# Patient Record
Sex: MALE | Race: OTHER | Employment: FULL TIME | URBAN - METROPOLITAN AREA
[De-identification: names, ages, dates, MRNs, and addresses within clinical notes are randomized per-mention and may not be internally consistent; named-entity substitution may affect disease eponyms.]

---

## 2019-06-12 ENCOUNTER — OFFICE VISIT (OUTPATIENT)
Dept: PRIMARY CARE CLINIC | Age: 39
End: 2019-06-12
Payer: COMMERCIAL

## 2019-06-12 VITALS
BODY MASS INDEX: 26.92 KG/M2 | HEART RATE: 91 BPM | SYSTOLIC BLOOD PRESSURE: 142 MMHG | OXYGEN SATURATION: 98 % | HEIGHT: 70 IN | DIASTOLIC BLOOD PRESSURE: 90 MMHG | WEIGHT: 188 LBS

## 2019-06-12 DIAGNOSIS — Z76.89 ENCOUNTER TO ESTABLISH CARE WITH NEW DOCTOR: Primary | ICD-10-CM

## 2019-06-12 DIAGNOSIS — I10 ESSENTIAL HYPERTENSION: ICD-10-CM

## 2019-06-12 PROCEDURE — 99204 OFFICE O/P NEW MOD 45 MIN: CPT | Performed by: INTERNAL MEDICINE

## 2019-06-12 RX ORDER — AMLODIPINE BESYLATE 10 MG/1
TABLET ORAL
COMMUNITY
Start: 2019-03-31 | End: 2019-07-15 | Stop reason: SDUPTHER

## 2019-06-12 RX ORDER — LOSARTAN POTASSIUM 50 MG/1
TABLET ORAL
COMMUNITY
Start: 2019-05-01 | End: 2019-07-15 | Stop reason: SDUPTHER

## 2019-06-12 RX ORDER — SIMVASTATIN 20 MG
TABLET ORAL
COMMUNITY
Start: 2019-06-09 | End: 2019-11-12 | Stop reason: SDUPTHER

## 2019-06-12 RX ORDER — LISINOPRIL 10 MG/1
TABLET ORAL
COMMUNITY
Start: 2019-05-02 | End: 2019-06-12

## 2019-06-12 RX ORDER — LABETALOL 200 MG/1
TABLET, FILM COATED ORAL
COMMUNITY
Start: 2019-05-30 | End: 2019-09-13 | Stop reason: SDUPTHER

## 2019-06-12 RX ORDER — CHLORTHALIDONE 25 MG/1
25 TABLET ORAL DAILY
Qty: 60 TABLET | Refills: 0 | Status: SHIPPED | OUTPATIENT
Start: 2019-06-12 | End: 2019-08-07 | Stop reason: SDUPTHER

## 2019-06-12 RX ORDER — CLONIDINE HYDROCHLORIDE 0.1 MG/1
TABLET ORAL
COMMUNITY
Start: 2019-04-08 | End: 2019-09-19 | Stop reason: SDUPTHER

## 2019-06-12 SDOH — HEALTH STABILITY: MENTAL HEALTH: HOW OFTEN DO YOU HAVE A DRINK CONTAINING ALCOHOL?: NOT ASKED

## 2019-06-12 ASSESSMENT — ENCOUNTER SYMPTOMS
COUGH: 0
CONSTIPATION: 0
VOMITING: 0
SHORTNESS OF BREATH: 0
NAUSEA: 0
ABDOMINAL PAIN: 0
ABDOMINAL DISTENTION: 0
WHEEZING: 0
SINUS PRESSURE: 0
SORE THROAT: 0
BACK PAIN: 0

## 2019-06-12 NOTE — PROGRESS NOTES
mouth daily 60 tablet 0     No current facility-administered medications for this visit. No Known Allergies    Health Maintenance   Topic Date Due    Varicella Vaccine (1 of 2 - 13+ 2-dose series) 04/30/1994    HIV screen  04/30/1996    DTaP/Tdap/Td vaccine (1 - Tdap) 04/30/2000    Flu vaccine (Season Ended) 09/01/2019    Potassium monitoring  09/10/2019    Creatinine monitoring  09/10/2019    Pneumococcal 0-64 years Vaccine  Aged Out       Subjective:      Review of Systems   Constitutional: Negative for appetite change, chills, fatigue and fever. HENT: Negative for congestion, sinus pressure, sneezing and sore throat. Eyes: Negative for visual disturbance. Respiratory: Negative for cough, shortness of breath and wheezing. Cardiovascular: Negative for chest pain and palpitations. Gastrointestinal: Negative for abdominal distention, abdominal pain, constipation, nausea and vomiting. Endocrine: Negative for cold intolerance, heat intolerance, polydipsia, polyphagia and polyuria. Genitourinary: Negative for decreased urine volume, difficulty urinating and urgency. Musculoskeletal: Negative for back pain and joint swelling. Skin: Negative for rash. Neurological: Negative for dizziness and headaches. Psychiatric/Behavioral: Negative for sleep disturbance. The patient is not nervous/anxious. All other systems reviewed and are negative. Objective:     Physical Exam   Constitutional: He is oriented to person, place, and time. Vital signs are normal. He appears well-developed and well-nourished. He is active. No distress. HENT:   Head: Normocephalic and atraumatic. Right Ear: Hearing normal.   Left Ear: Hearing normal.   Mouth/Throat: Uvula is midline, oropharynx is clear and moist and mucous membranes are normal.   Eyes: Pupils are equal, round, and reactive to light. Conjunctivae and EOM are normal. No scleral icterus.    Neck: Normal range of motion, full passive range of motion without pain and phonation normal. No JVD present. No thyromegaly present. Cardiovascular: Normal rate, regular rhythm, normal heart sounds, intact distal pulses and normal pulses. No murmur heard. Pulses:       Carotid pulses are 2+ on the right side, and 2+ on the left side. Radial pulses are 2+ on the right side, and 2+ on the left side. Pulmonary/Chest: Effort normal and breath sounds normal. No accessory muscle usage. No respiratory distress. He has no wheezes. He has no rales. Abdominal: Soft. Normal appearance and bowel sounds are normal. He exhibits no distension. There is no tenderness. There is no rebound. Musculoskeletal: Normal range of motion. He exhibits no edema or deformity. Lymphadenopathy:     He has no cervical adenopathy. Neurological: He is alert and oriented to person, place, and time. He has normal strength. No sensory deficit. Skin: Skin is warm and intact. Capillary refill takes less than 2 seconds. No rash noted. He is not diaphoretic. No cyanosis. Nails show no clubbing. Psychiatric: He has a normal mood and affect. His speech is normal and behavior is normal. Cognition and memory are normal.   Nursing note and vitals reviewed. BP (!) 142/90 (Site: Left Upper Arm, Position: Sitting, Cuff Size: Medium Adult)   Pulse 91   Ht 5' 10\" (1.778 m)   Wt 188 lb (85.3 kg)   SpO2 98%   BMI 26.98 kg/m²     Assessment:          1. Encounter to establish care with new doctor      2. Essential hypertension  Dash diet   - amLODIPine (NORVASC) 10 MG tablet  - cloNIDine (CATAPRES) 0.1 MG tablet  - labetalol (NORMODYNE) 200 MG tablet  - losartan (COZAAR) 50 MG tablet  - chlorthalidone (HYGROTON) 25 MG tablet; Take 1 tablet by mouth daily  Dispense: 60 tablet; Refill: 0            Diagnosis Orders   1. Encounter to establish care with new doctor     2.  Essential hypertension  amLODIPine (NORVASC) 10 MG tablet    cloNIDine (CATAPRES) 0.1 MG tablet    labetalol (NORMODYNE) 200 MG tablet    losartan (COZAAR) 50 MG tablet    chlorthalidone (HYGROTON) 25 MG tablet           Plan:      Return in about 2 months (around 8/12/2019) for Routine follow-up. No orders of the defined types were placed in this encounter. Orders Placed This Encounter   Medications    chlorthalidone (HYGROTON) 25 MG tablet     Sig: Take 1 tablet by mouth daily     Dispense:  60 tablet     Refill:  0         Patient given educational materials - see patient instructions. Discussed use, benefit, and side effects of prescribedmedications. All patient questions answered. Pt voiced understanding. Reviewed health maintenance. Instructed to continue current medications, diet and exercise. Patient agreed with treatment plan. Follow up as directed. Of the given duration appointment visit, Dr. Ruslan Riddle MD  spent at least 50% of the face-to-face time in counseling, explanation of diagnosis, planning of further management, and answering all questions. Electronically signed by Ruslan Riddle MD on 6/12/2019 at 4:58 PM      Please note that this chart was generated using voice recognition Dragon dictation software. Although every effort was made to ensure the accuracy of this automatedtranscription, some errors in transcription may have occurred.

## 2019-06-12 NOTE — PROGRESS NOTES
Visit Information    Have you changed or started any medications since your last visit including any over-the-counter medicines, vitamins, or herbal medicines? no   Have you stopped taking any of your medications? Is so, why? -  no  Are you having any side effects from any of your medications? - no    Have you seen any other physician or provider since your last visit?  no   Have you had any other diagnostic tests since your last visit?  no   Have you been seen in the emergency room and/or had an admission in a hospital since we last saw you?  no   Have you had your routine dental cleaning in the past 6 months?  yes -     Do you have an active MyChart account? If no, what is the barrier?   No:     Patient Care Team:  Christian Willis MD as PCP - General (Internal Medicine)    Medical History Review  Past Medical, Family, and Social History reviewed and does contribute to the patient presenting condition    Health Maintenance   Topic Date Due    Varicella Vaccine (1 of 2 - 13+ 2-dose series) 04/30/1994    HIV screen  04/30/1996    DTaP/Tdap/Td vaccine (1 - Tdap) 04/30/2000    Flu vaccine (Season Ended) 09/01/2019    Pneumococcal 0-64 years Vaccine  Aged Out

## 2019-07-15 DIAGNOSIS — I10 ESSENTIAL HYPERTENSION: ICD-10-CM

## 2019-07-15 RX ORDER — AMLODIPINE BESYLATE 10 MG/1
10 TABLET ORAL DAILY
Qty: 90 TABLET | Refills: 0 | Status: SHIPPED | OUTPATIENT
Start: 2019-07-15 | End: 2019-10-11 | Stop reason: SDUPTHER

## 2019-07-15 RX ORDER — LOSARTAN POTASSIUM 50 MG/1
50 TABLET ORAL DAILY
Qty: 90 TABLET | Refills: 0 | Status: SHIPPED | OUTPATIENT
Start: 2019-07-15 | End: 2019-11-12 | Stop reason: SDUPTHER

## 2019-08-07 DIAGNOSIS — I10 ESSENTIAL HYPERTENSION: ICD-10-CM

## 2019-08-07 RX ORDER — CHLORTHALIDONE 25 MG/1
25 TABLET ORAL DAILY
Qty: 60 TABLET | Refills: 0 | Status: SHIPPED | OUTPATIENT
Start: 2019-08-07 | End: 2019-10-11 | Stop reason: SDUPTHER

## 2019-08-20 ENCOUNTER — OFFICE VISIT (OUTPATIENT)
Dept: PRIMARY CARE CLINIC | Age: 39
End: 2019-08-20
Payer: COMMERCIAL

## 2019-08-20 VITALS
DIASTOLIC BLOOD PRESSURE: 74 MMHG | HEART RATE: 84 BPM | OXYGEN SATURATION: 98 % | BODY MASS INDEX: 27 KG/M2 | WEIGHT: 188.2 LBS | RESPIRATION RATE: 15 BRPM | SYSTOLIC BLOOD PRESSURE: 120 MMHG

## 2019-08-20 DIAGNOSIS — B35.4 RINGWORM OF BODY: ICD-10-CM

## 2019-08-20 DIAGNOSIS — M62.830 BACK MUSCLE SPASM: Primary | ICD-10-CM

## 2019-08-20 PROCEDURE — 99213 OFFICE O/P EST LOW 20 MIN: CPT | Performed by: INTERNAL MEDICINE

## 2019-08-20 RX ORDER — CYCLOBENZAPRINE HCL 10 MG
10 TABLET ORAL NIGHTLY PRN
Qty: 30 TABLET | Refills: 0 | Status: SHIPPED | OUTPATIENT
Start: 2019-08-20 | End: 2019-08-30

## 2019-08-20 RX ORDER — IBUPROFEN 600 MG/1
600 TABLET ORAL EVERY 6 HOURS PRN
Qty: 30 TABLET | Refills: 0 | Status: SHIPPED | OUTPATIENT
Start: 2019-08-20 | End: 2019-11-20

## 2019-08-20 RX ORDER — CLOTRIMAZOLE AND BETAMETHASONE DIPROPIONATE 10; .64 MG/G; MG/G
CREAM TOPICAL
Qty: 45 G | Refills: 1 | Status: SHIPPED | OUTPATIENT
Start: 2019-08-20 | End: 2019-11-20 | Stop reason: ALTCHOICE

## 2019-08-20 ASSESSMENT — PATIENT HEALTH QUESTIONNAIRE - PHQ9
SUM OF ALL RESPONSES TO PHQ9 QUESTIONS 1 & 2: 0
SUM OF ALL RESPONSES TO PHQ QUESTIONS 1-9: 0
1. LITTLE INTEREST OR PLEASURE IN DOING THINGS: 0
2. FEELING DOWN, DEPRESSED OR HOPELESS: 0
SUM OF ALL RESPONSES TO PHQ QUESTIONS 1-9: 0

## 2019-08-22 ASSESSMENT — ENCOUNTER SYMPTOMS
VOMITING: 0
ABDOMINAL DISTENTION: 0
BACK PAIN: 1
WHEEZING: 0
ABDOMINAL PAIN: 0
SORE THROAT: 0
SINUS PRESSURE: 0
NAUSEA: 0
COUGH: 0
SHORTNESS OF BREATH: 0
CONSTIPATION: 0

## 2019-08-23 NOTE — PROGRESS NOTES
oropharynx is clear and moist and mucous membranes are normal.   Eyes: Pupils are equal, round, and reactive to light. Conjunctivae and EOM are normal. No scleral icterus. Neck: Normal range of motion, full passive range of motion without pain and phonation normal. No JVD present. No thyromegaly present. Cardiovascular: Normal rate, regular rhythm, normal heart sounds, intact distal pulses and normal pulses. No murmur heard. Pulses:       Carotid pulses are 2+ on the right side, and 2+ on the left side. Radial pulses are 2+ on the right side, and 2+ on the left side. Pulmonary/Chest: Effort normal and breath sounds normal. No accessory muscle usage. No respiratory distress. He has no wheezes. He has no rales. Abdominal: Soft. Normal appearance and bowel sounds are normal. He exhibits no distension. There is no tenderness. There is no rebound. Musculoskeletal: Normal range of motion. He exhibits tenderness (Paraspinal muscle area). He exhibits no edema or deformity. Lymphadenopathy:     He has no cervical adenopathy. Neurological: He is alert and oriented to person, place, and time. He has normal strength. No sensory deficit. Skin: Skin is warm and intact. Capillary refill takes less than 2 seconds. No rash noted. He is not diaphoretic. No cyanosis. Nails show no clubbing. Psychiatric: He has a normal mood and affect. His speech is normal and behavior is normal. Cognition and memory are normal.   Nursing note and vitals reviewed. /74   Pulse 84   Resp 15   Wt 188 lb 3.2 oz (85.4 kg)   SpO2 98%   BMI 27.00 kg/m²     Assessment:          1. Back muscle spasm    - cyclobenzaprine (FLEXERIL) 10 MG tablet; Take 1 tablet by mouth nightly as needed for Muscle spasms  Dispense: 30 tablet; Refill: 0  - ibuprofen (ADVIL;MOTRIN) 600 MG tablet; Take 1 tablet by mouth every 6 hours as needed for Pain  Dispense: 30 tablet; Refill: 0    2.  Ringworm of body    - clotrimazole-betamethasone

## 2019-09-13 DIAGNOSIS — I10 ESSENTIAL HYPERTENSION: ICD-10-CM

## 2019-09-13 RX ORDER — LABETALOL 200 MG/1
200 TABLET, FILM COATED ORAL 2 TIMES DAILY
Qty: 180 TABLET | Refills: 1 | Status: SHIPPED | OUTPATIENT
Start: 2019-09-13 | End: 2020-03-09

## 2019-09-19 DIAGNOSIS — I10 ESSENTIAL HYPERTENSION: ICD-10-CM

## 2019-09-19 RX ORDER — CLONIDINE HYDROCHLORIDE 0.1 MG/1
0.1 TABLET ORAL 2 TIMES DAILY
Qty: 60 TABLET | Refills: 2 | Status: SHIPPED | OUTPATIENT
Start: 2019-09-19 | End: 2019-12-19

## 2019-10-11 DIAGNOSIS — I10 ESSENTIAL HYPERTENSION: ICD-10-CM

## 2019-10-11 RX ORDER — CHLORTHALIDONE 25 MG/1
25 TABLET ORAL DAILY
Qty: 90 TABLET | Refills: 0 | Status: SHIPPED | OUTPATIENT
Start: 2019-10-11 | End: 2019-11-12 | Stop reason: SDUPTHER

## 2019-10-11 RX ORDER — AMLODIPINE BESYLATE 10 MG/1
10 TABLET ORAL DAILY
Qty: 90 TABLET | Refills: 0 | Status: SHIPPED | OUTPATIENT
Start: 2019-10-11 | End: 2019-11-12 | Stop reason: SDUPTHER

## 2019-11-12 DIAGNOSIS — I10 ESSENTIAL HYPERTENSION: ICD-10-CM

## 2019-11-12 RX ORDER — SIMVASTATIN 20 MG
20 TABLET ORAL NIGHTLY
Qty: 90 TABLET | Refills: 3 | Status: SHIPPED | OUTPATIENT
Start: 2019-11-12 | End: 2020-11-24

## 2019-11-12 RX ORDER — CHLORTHALIDONE 25 MG/1
25 TABLET ORAL DAILY
Qty: 90 TABLET | Refills: 3 | Status: SHIPPED | OUTPATIENT
Start: 2019-11-12 | End: 2020-12-29

## 2019-11-12 RX ORDER — AMLODIPINE BESYLATE 10 MG/1
10 TABLET ORAL DAILY
Qty: 90 TABLET | Refills: 3 | Status: SHIPPED | OUTPATIENT
Start: 2019-11-12 | End: 2021-01-06

## 2019-11-12 RX ORDER — LOSARTAN POTASSIUM 50 MG/1
50 TABLET ORAL DAILY
Qty: 90 TABLET | Refills: 3 | Status: SHIPPED | OUTPATIENT
Start: 2019-11-12 | End: 2021-02-02

## 2019-11-20 ENCOUNTER — OFFICE VISIT (OUTPATIENT)
Dept: PRIMARY CARE CLINIC | Age: 39
End: 2019-11-20
Payer: COMMERCIAL

## 2019-11-20 VITALS
RESPIRATION RATE: 16 BRPM | DIASTOLIC BLOOD PRESSURE: 70 MMHG | WEIGHT: 195 LBS | BODY MASS INDEX: 27.98 KG/M2 | HEART RATE: 76 BPM | OXYGEN SATURATION: 98 % | SYSTOLIC BLOOD PRESSURE: 130 MMHG

## 2019-11-20 DIAGNOSIS — Z13.220 LIPID SCREENING: ICD-10-CM

## 2019-11-20 DIAGNOSIS — I10 ESSENTIAL HYPERTENSION: Primary | ICD-10-CM

## 2019-11-20 PROCEDURE — 99213 OFFICE O/P EST LOW 20 MIN: CPT | Performed by: INTERNAL MEDICINE

## 2019-11-24 ASSESSMENT — ENCOUNTER SYMPTOMS
CONSTIPATION: 0
NAUSEA: 0
WHEEZING: 0
SORE THROAT: 0
ABDOMINAL DISTENTION: 0
SHORTNESS OF BREATH: 0
COUGH: 0
BACK PAIN: 0
VOMITING: 0
SINUS PRESSURE: 0
ABDOMINAL PAIN: 0

## 2019-12-18 DIAGNOSIS — I10 ESSENTIAL HYPERTENSION: ICD-10-CM

## 2019-12-19 RX ORDER — CLONIDINE HYDROCHLORIDE 0.1 MG/1
0.1 TABLET ORAL 2 TIMES DAILY
Qty: 60 TABLET | Refills: 5 | Status: SHIPPED | OUTPATIENT
Start: 2019-12-19 | End: 2020-06-24

## 2020-02-20 ENCOUNTER — OFFICE VISIT (OUTPATIENT)
Dept: PRIMARY CARE CLINIC | Age: 40
End: 2020-02-20
Payer: COMMERCIAL

## 2020-02-20 VITALS
BODY MASS INDEX: 28.01 KG/M2 | OXYGEN SATURATION: 99 % | SYSTOLIC BLOOD PRESSURE: 128 MMHG | RESPIRATION RATE: 16 BRPM | HEART RATE: 75 BPM | WEIGHT: 195.2 LBS | DIASTOLIC BLOOD PRESSURE: 76 MMHG

## 2020-02-20 PROCEDURE — 99214 OFFICE O/P EST MOD 30 MIN: CPT | Performed by: INTERNAL MEDICINE

## 2020-02-20 RX ORDER — TADALAFIL 20 MG/1
20 TABLET ORAL DAILY PRN
Qty: 30 TABLET | Refills: 1 | Status: SHIPPED | OUTPATIENT
Start: 2020-02-20 | End: 2021-03-12 | Stop reason: SDUPTHER

## 2020-02-20 ASSESSMENT — PATIENT HEALTH QUESTIONNAIRE - PHQ9
1. LITTLE INTEREST OR PLEASURE IN DOING THINGS: 0
SUM OF ALL RESPONSES TO PHQ QUESTIONS 1-9: 0
2. FEELING DOWN, DEPRESSED OR HOPELESS: 0
SUM OF ALL RESPONSES TO PHQ QUESTIONS 1-9: 0
SUM OF ALL RESPONSES TO PHQ9 QUESTIONS 1 & 2: 0

## 2020-02-26 PROBLEM — E78.2 MIXED HYPERLIPIDEMIA: Status: ACTIVE | Noted: 2020-02-26

## 2020-02-26 PROBLEM — N52.9 ERECTILE DYSFUNCTION: Status: ACTIVE | Noted: 2020-02-26

## 2020-02-26 ASSESSMENT — ENCOUNTER SYMPTOMS
ABDOMINAL DISTENTION: 0
VOMITING: 0
BACK PAIN: 0
SORE THROAT: 0
SHORTNESS OF BREATH: 0
ABDOMINAL PAIN: 0
WHEEZING: 0
NAUSEA: 0
CONSTIPATION: 0
SINUS PRESSURE: 0
COUGH: 0

## 2020-02-26 NOTE — PROGRESS NOTES
tablet Take 1 tablet by mouth daily 90 tablet 3    labetalol (NORMODYNE) 200 MG tablet Take 1 tablet by mouth 2 times daily 180 tablet 1    Multiple Vitamin (MULTI-VITAMIN DAILY PO) Take by mouth       No current facility-administered medications for this visit. No Known Allergies    Health Maintenance   Topic Date Due    Varicella vaccine (1 of 2 - 2-dose childhood series) 04/30/1982    HIV screen  04/30/1996    Lipid screen  09/10/2019    Potassium monitoring  09/10/2019    Creatinine monitoring  09/10/2019    DTaP/Tdap/Td vaccine (1 - Tdap) 08/20/2020 (Originally 4/30/1992)    Flu vaccine (1) 11/20/2020 (Originally 9/1/2019)    Shingles Vaccine (1 of 2) 04/30/2031    Hepatitis A vaccine  Aged Out    Hepatitis B vaccine  Aged Out    Hib vaccine  Aged Out    Meningococcal (ACWY) vaccine  Aged Out    Pneumococcal 0-64 years Vaccine  Aged Out       Subjective:      Review of Systems   Constitutional: Negative for appetite change, chills, fatigue and fever. HENT: Negative for congestion, sinus pressure, sneezing and sore throat. Eyes: Negative for visual disturbance. Respiratory: Negative for cough, shortness of breath and wheezing. Cardiovascular: Negative for chest pain and palpitations. Gastrointestinal: Negative for abdominal distention, abdominal pain, constipation, nausea and vomiting. Endocrine: Negative for cold intolerance, heat intolerance, polydipsia, polyphagia and polyuria. Genitourinary: Negative for decreased urine volume, difficulty urinating and urgency. Musculoskeletal: Negative for back pain and joint swelling. Skin: Negative for rash. Neurological: Negative for dizziness and headaches. Psychiatric/Behavioral: Negative for sleep disturbance. The patient is not nervous/anxious. All other systems reviewed and are negative. Objective:     Physical Exam  Vitals signs and nursing note reviewed.    Constitutional:       General: He is not in acute distress. Appearance: Normal appearance. He is well-developed. He is not diaphoretic. HENT:      Head: Normocephalic and atraumatic. Right Ear: Hearing normal.      Left Ear: Hearing normal.      Mouth/Throat:      Pharynx: Uvula midline. Eyes:      General: No scleral icterus. Conjunctiva/sclera: Conjunctivae normal.      Pupils: Pupils are equal, round, and reactive to light. Neck:      Musculoskeletal: Full passive range of motion without pain and normal range of motion. Thyroid: No thyromegaly. Vascular: No JVD. Trachea: Phonation normal.   Cardiovascular:      Rate and Rhythm: Normal rate and regular rhythm. Pulses: Normal pulses. Carotid pulses are 2+ on the right side and 2+ on the left side. Radial pulses are 2+ on the right side and 2+ on the left side. Heart sounds: Normal heart sounds. No murmur. Pulmonary:      Effort: Pulmonary effort is normal. No accessory muscle usage or respiratory distress. Breath sounds: Normal breath sounds. No wheezing or rales. Abdominal:      General: Bowel sounds are normal. There is no distension. Palpations: Abdomen is soft. Tenderness: There is no abdominal tenderness. There is no rebound. Musculoskeletal: Normal range of motion. General: No deformity. Lymphadenopathy:      Cervical: No cervical adenopathy. Skin:     General: Skin is warm. Capillary Refill: Capillary refill takes less than 2 seconds. Findings: No rash. Nails: There is no clubbing. Neurological:      Mental Status: He is alert and oriented to person, place, and time. Sensory: No sensory deficit. Psychiatric:         Speech: Speech normal.         Behavior: Behavior normal.       /76   Pulse 75   Resp 16   Wt 195 lb 3.2 oz (88.5 kg)   SpO2 99%   BMI 28.01 kg/m²     Assessment:          1. Essential hypertension  On clonidine, Cozaar, Norvasc, Hygroton, labetalol    2.  Mixed

## 2020-03-05 ENCOUNTER — HOSPITAL ENCOUNTER (OUTPATIENT)
Age: 40
Discharge: HOME | End: 2020-03-05
Payer: COMMERCIAL

## 2020-03-05 LAB
ANION GAP SERPL CALCULATED.3IONS-SCNC: 11 MMOL/L (ref 9–17)
BUN BLDV-MCNC: 21 MG/DL (ref 6–20)
BUN/CREAT BLD: ABNORMAL (ref 9–20)
CALCIUM SERPL-MCNC: 8.9 MG/DL (ref 8.6–10.4)
CHLORIDE BLD-SCNC: 97 MMOL/L (ref 98–107)
CHOLESTEROL/HDL RATIO: 3
CHOLESTEROL: 162 MG/DL
CO2: 26 MMOL/L (ref 20–31)
CREAT SERPL-MCNC: 0.98 MG/DL (ref 0.7–1.2)
GFR AFRICAN AMERICAN: >60 ML/MIN
GFR NON-AFRICAN AMERICAN: >60 ML/MIN
GFR SERPL CREATININE-BSD FRML MDRD: ABNORMAL ML/MIN/{1.73_M2}
GFR SERPL CREATININE-BSD FRML MDRD: ABNORMAL ML/MIN/{1.73_M2}
GLUCOSE BLD-MCNC: 109 MG/DL (ref 70–99)
HDLC SERPL-MCNC: 54 MG/DL
LDL CHOLESTEROL: 99 MG/DL (ref 0–130)
POTASSIUM SERPL-SCNC: 3.9 MMOL/L (ref 3.7–5.3)
SODIUM BLD-SCNC: 134 MMOL/L (ref 135–144)
TRIGL SERPL-MCNC: 43 MG/DL
VLDLC SERPL CALC-MCNC: NORMAL MG/DL (ref 1–30)

## 2020-03-05 PROCEDURE — 36415 COLL VENOUS BLD VENIPUNCTURE: CPT

## 2020-03-05 PROCEDURE — 80061 LIPID PANEL: CPT

## 2020-03-05 PROCEDURE — 80048 BASIC METABOLIC PNL TOTAL CA: CPT

## 2020-03-09 RX ORDER — LABETALOL 200 MG/1
TABLET, FILM COATED ORAL
Qty: 180 TABLET | Refills: 0 | Status: SHIPPED | OUTPATIENT
Start: 2020-03-09 | End: 2020-06-08

## 2020-05-21 ENCOUNTER — OFFICE VISIT (OUTPATIENT)
Dept: PRIMARY CARE CLINIC | Age: 40
End: 2020-05-21
Payer: COMMERCIAL

## 2020-05-21 VITALS
RESPIRATION RATE: 16 BRPM | SYSTOLIC BLOOD PRESSURE: 116 MMHG | BODY MASS INDEX: 27.09 KG/M2 | OXYGEN SATURATION: 99 % | WEIGHT: 188.8 LBS | DIASTOLIC BLOOD PRESSURE: 78 MMHG | HEART RATE: 67 BPM

## 2020-05-21 PROCEDURE — 99214 OFFICE O/P EST MOD 30 MIN: CPT | Performed by: INTERNAL MEDICINE

## 2020-05-21 RX ORDER — GREEN TEA/HOODIA GORDONII 315-12.5MG
1 CAPSULE ORAL 2 TIMES DAILY
Qty: 60 TABLET | Refills: 0 | Status: SHIPPED | OUTPATIENT
Start: 2020-05-21 | End: 2020-06-20

## 2020-05-21 RX ORDER — AMOXICILLIN AND CLAVULANATE POTASSIUM 875; 125 MG/1; MG/1
1 TABLET, FILM COATED ORAL 2 TIMES DAILY
Qty: 20 TABLET | Refills: 0 | Status: SHIPPED | OUTPATIENT
Start: 2020-05-21 | End: 2020-05-31

## 2020-05-25 ASSESSMENT — ENCOUNTER SYMPTOMS
COUGH: 0
SINUS PAIN: 1
SINUS PRESSURE: 1
SHORTNESS OF BREATH: 0
NAUSEA: 0
VOMITING: 0
ABDOMINAL PAIN: 0
CONSTIPATION: 0
WHEEZING: 0
SORE THROAT: 0
ABDOMINAL DISTENTION: 0
BACK PAIN: 1

## 2020-05-25 NOTE — PROGRESS NOTES
[facility]  [address]   [address]  [address] [address]  Dept: [phone]  Dept Fax: [phone]    Thomas Siu is a 45 y.o. male who presents today for his medical conditions/complaints as noted below. Chief Complaint   Patient presents with    Follow-up     3m fu, right ear \"fullness\"       HPI:     This is a 44-year-old male who is here for regular follow-up. He has past medical history of essential hypertension, hyperlipidemia. Currently he is complaining of right ear fullness and also sinus pressure and pain. He is also been having back muscle spasms and is also complaining of increased urinary frequency which is been going on for few months now. Blood pressure is well controlled. No other complaints.       No results found for: LABA1C          ( goal A1C is < 7)   No results found for: LABMICR  LDL Cholesterol (mg/dL)   Date Value   03/05/2020 99       (goal LDL is <100)   BUN (mg/dL)   Date Value   03/05/2020 21 (H)     BP Readings from Last 3 Encounters:   05/21/20 116/78   02/20/20 128/76   11/20/19 130/70          (goal 120/80)    Past Medical History:   Diagnosis Date    Hyperlipidemia     Hypertension       Past Surgical History:   Procedure Laterality Date    FOOT SURGERY      TONSILLECTOMY AND ADENOIDECTOMY         Family History   Problem Relation Age of Onset    Heart Attack Maternal Cousin        Social History     Tobacco Use    Smoking status: Never Smoker    Smokeless tobacco: Never Used   Substance Use Topics    Alcohol use: Yes     Comment: rare       Current Outpatient Medications   Medication Sig Dispense Refill    amoxicillin-clavulanate (AUGMENTIN) 875-125 MG per tablet Take 1 tablet by mouth 2 times daily for 10 days 20 tablet 0    Probiotic Acidophilus (FLORANEX) TABS Take 1 tablet by mouth 2 times daily 60 tablet 0    labetalol (NORMODYNE) 200 MG tablet TAKE ONE TABLET BY MOUTH TWICE A  tablet 0    tadalafil (CIALIS) 20 MG tablet Take 1 tablet by mouth daily as needed for Erectile Dysfunction 30 tablet 1    cloNIDine (CATAPRES) 0.1 MG tablet Take 1 tablet by mouth 2 times daily 60 tablet 5    simvastatin (ZOCOR) 20 MG tablet Take 1 tablet by mouth nightly 90 tablet 3    losartan (COZAAR) 50 MG tablet Take 1 tablet by mouth daily 90 tablet 3    amLODIPine (NORVASC) 10 MG tablet Take 1 tablet by mouth daily 90 tablet 3    chlorthalidone (HYGROTON) 25 MG tablet Take 1 tablet by mouth daily 90 tablet 3    Multiple Vitamin (MULTI-VITAMIN DAILY PO) Take by mouth       No current facility-administered medications for this visit. No Known Allergies    Health Maintenance   Topic Date Due    Varicella vaccine (1 of 2 - 2-dose childhood series) 04/30/1982    HIV screen  04/30/1996    DTaP/Tdap/Td vaccine (1 - Tdap) 08/20/2020 (Originally 4/30/2000)    Flu vaccine (Season Ended) 11/20/2020 (Originally 9/1/2020)    Lipid screen  03/05/2021    Potassium monitoring  03/05/2021    Creatinine monitoring  03/05/2021    Hepatitis A vaccine  Aged Out    Hepatitis B vaccine  Aged Out    Hib vaccine  Aged Out    Meningococcal (ACWY) vaccine  Aged Out    Pneumococcal 0-64 years Vaccine  Aged Out       Subjective:      Review of Systems   Constitutional: Negative for appetite change, chills, fatigue and fever. HENT: Positive for congestion, sinus pressure and sinus pain. Negative for sneezing and sore throat. Ear fullness     Eyes: Negative for visual disturbance. Respiratory: Negative for cough, shortness of breath and wheezing. Cardiovascular: Negative for chest pain and palpitations. Gastrointestinal: Negative for abdominal distention, abdominal pain, constipation, nausea and vomiting. Endocrine: Negative for cold intolerance, heat intolerance, polydipsia, polyphagia and polyuria. Genitourinary: Positive for frequency.  Negative for decreased urine volume, difficulty tablet by mouth 2 times daily for 10 days     Dispense:  20 tablet     Refill:  0    Probiotic Acidophilus (FLORANEX) TABS     Sig: Take 1 tablet by mouth 2 times daily     Dispense:  60 tablet     Refill:  0         Patient given educational materials - see patient instructions. Discussed use, benefit, and side effects of prescribedmedications. All patient questions answered. Pt voiced understanding. Reviewed health maintenance. Instructed to continue current medications, diet and exercise. Patient agreed with treatment plan. Follow up as directed. I spent a total of 25 minutes face to face with this patient. Over 50% of that time was spent on counseling and care coordination. Please see assessment and plan for details. Electronically signed by Noel Huston MD on 5/25/2020 at 2:33 PM      Please note that this chart was generated using voice recognition Dragon dictation software. Although every effort was made to ensure the accuracy of this automatedtranscription, some errors in transcription may have occurred.

## 2020-06-08 RX ORDER — LABETALOL 200 MG/1
TABLET, FILM COATED ORAL
Qty: 180 TABLET | Refills: 0 | Status: SHIPPED | OUTPATIENT
Start: 2020-06-08 | End: 2020-09-14

## 2020-06-24 RX ORDER — CLONIDINE HYDROCHLORIDE 0.1 MG/1
TABLET ORAL
Qty: 60 TABLET | Refills: 4 | Status: SHIPPED | OUTPATIENT
Start: 2020-06-24 | End: 2020-11-24

## 2020-09-02 ENCOUNTER — TELEPHONE (OUTPATIENT)
Dept: PRIMARY CARE CLINIC | Age: 40
End: 2020-09-02

## 2020-09-02 ENCOUNTER — OFFICE VISIT (OUTPATIENT)
Dept: PRIMARY CARE CLINIC | Age: 40
End: 2020-09-02
Payer: COMMERCIAL

## 2020-09-02 VITALS
BODY MASS INDEX: 26.37 KG/M2 | RESPIRATION RATE: 15 BRPM | HEIGHT: 70 IN | OXYGEN SATURATION: 98 % | DIASTOLIC BLOOD PRESSURE: 82 MMHG | HEART RATE: 72 BPM | SYSTOLIC BLOOD PRESSURE: 130 MMHG | WEIGHT: 184.2 LBS

## 2020-09-02 PROBLEM — Z87.81 HX OF FRACTURE OF FOOT: Status: ACTIVE | Noted: 2020-09-02

## 2020-09-02 PROCEDURE — 99214 OFFICE O/P EST MOD 30 MIN: CPT | Performed by: INTERNAL MEDICINE

## 2020-09-02 RX ORDER — CLOTRIMAZOLE AND BETAMETHASONE DIPROPIONATE 10; .64 MG/G; MG/G
CREAM TOPICAL
Qty: 45 G | Refills: 0 | Status: SHIPPED | OUTPATIENT
Start: 2020-09-02 | End: 2020-12-02

## 2020-09-02 NOTE — TELEPHONE ENCOUNTER
Kindly give him the paperwork for the x-ray and the referral which was placed for Dr. José Miguel Hemphill and he can go and get the x-ray at any [facility] facility.

## 2020-09-07 ASSESSMENT — ENCOUNTER SYMPTOMS
VOMITING: 0
WHEEZING: 0
ABDOMINAL PAIN: 0
COUGH: 0
CONSTIPATION: 0
NAUSEA: 0
SORE THROAT: 0
BACK PAIN: 0
SHORTNESS OF BREATH: 0
ABDOMINAL DISTENTION: 0
SINUS PRESSURE: 0

## 2020-09-07 NOTE — PROGRESS NOTES
tablet Take 1 tablet by mouth daily as needed for Erectile Dysfunction (Patient taking differently: Take 20 mg by mouth daily as needed for Erectile Dysfunction Indications: 5mg ) 30 tablet 1    simvastatin (ZOCOR) 20 MG tablet Take 1 tablet by mouth nightly 90 tablet 3    losartan (COZAAR) 50 MG tablet Take 1 tablet by mouth daily 90 tablet 3    amLODIPine (NORVASC) 10 MG tablet Take 1 tablet by mouth daily 90 tablet 3    chlorthalidone (HYGROTON) 25 MG tablet Take 1 tablet by mouth daily 90 tablet 3     No current facility-administered medications for this visit. No Known Allergies    Health Maintenance   Topic Date Due    Varicella vaccine (1 of 2 - 2-dose childhood series) 04/30/1982    HIV screen  04/30/1996    DTaP/Tdap/Td vaccine (1 - Tdap) 09/02/2021 (Originally 4/30/2000)    Flu vaccine (1) 09/02/2021 (Originally 9/1/2020)    Lipid screen  03/05/2021    Potassium monitoring  03/05/2021    Creatinine monitoring  03/05/2021    Hepatitis A vaccine  Aged Out    Hepatitis B vaccine  Aged Out    Hib vaccine  Aged Out    Meningococcal (ACWY) vaccine  Aged Out    Pneumococcal 0-64 years Vaccine  Aged Out       Subjective:      Review of Systems   Constitutional: Negative for appetite change, chills, fatigue and fever. HENT: Negative for congestion, sinus pressure, sneezing and sore throat. Eyes: Negative for visual disturbance. Respiratory: Negative for cough, shortness of breath and wheezing. Cardiovascular: Negative for chest pain and palpitations. Gastrointestinal: Negative for abdominal distention, abdominal pain, constipation, nausea and vomiting. Endocrine: Negative for cold intolerance, heat intolerance, polydipsia, polyphagia and polyuria. Genitourinary: Negative for decreased urine volume, difficulty urinating and urgency. Musculoskeletal: Positive for arthralgias (Left foot pain). Negative for back pain and joint swelling. Skin: Positive for rash ( left wrist). Neurological: Negative for dizziness and headaches. Psychiatric/Behavioral: Negative for sleep disturbance. The patient is not nervous/anxious. Objective:     Physical Exam  Vitals signs and nursing note reviewed. Constitutional:       General: He is not in acute distress. Appearance: Normal appearance. He is well-developed. He is not diaphoretic. HENT:      Head: Normocephalic and atraumatic. Right Ear: Hearing normal.      Left Ear: Hearing normal.      Mouth/Throat:      Pharynx: Uvula midline. Eyes:      General: No scleral icterus. Conjunctiva/sclera: Conjunctivae normal.      Pupils: Pupils are equal, round, and reactive to light. Neck:      Musculoskeletal: Full passive range of motion without pain and normal range of motion. Thyroid: No thyromegaly. Vascular: No JVD. Trachea: Phonation normal.   Cardiovascular:      Rate and Rhythm: Normal rate and regular rhythm. Pulses: Normal pulses. Carotid pulses are 2+ on the right side and 2+ on the left side. Radial pulses are 2+ on the right side and 2+ on the left side. Heart sounds: Normal heart sounds. No murmur. Pulmonary:      Effort: Pulmonary effort is normal. No accessory muscle usage or respiratory distress. Breath sounds: Normal breath sounds. No wheezing or rales. Abdominal:      General: Bowel sounds are normal. There is no distension. Palpations: Abdomen is soft. Tenderness: There is no abdominal tenderness. There is no rebound. Musculoskeletal: Normal range of motion. General: No deformity. Lymphadenopathy:      Cervical: No cervical adenopathy. Skin:     General: Skin is warm. Capillary Refill: Capillary refill takes less than 2 seconds. Findings: No rash. Nails: There is no clubbing. Neurological:      Mental Status: He is alert and oriented to person, place, and time. Sensory: No sensory deficit.    Psychiatric: Speech: Speech normal.         Behavior: Behavior normal.       /82   Pulse 72   Resp 15   Ht 5' 10\" (1.778 m)   Wt 184 lb 3.2 oz (83.6 kg)   SpO2 98%   BMI 26.43 kg/m²     Assessment:          1. Essential hypertension  Well controlled2    2. Hx of fracture of foot    - XR FOOT LEFT (MIN 3 VIEWS); Future  - Marilyn Quick MD, Orthopedic Surgery (foot & ankle), [place]    3. Mixed hyperlipidemia  lipitor    4. Ringworm of body    - clotrimazole-betamethasone (LOTRISONE) 1-0.05 % cream; Apply topically 2 times daily. Dispense: 45 g; Refill: 0            Diagnosis Orders   1. Essential hypertension     2. Hx of fracture of foot  XR FOOT LEFT (MIN 3 VIEWS)    Marilyn Quick MD, Orthopedic Surgery (foot & ankle), [place]   3. Mixed hyperlipidemia     4. Ringworm of body  clotrimazole-betamethasone (LOTRISONE) 1-0.05 % cream           Plan:      Return in about 3 months (around 12/2/2020) for Routine follow-up. Orders Placed This Encounter   Procedures    XR FOOT LEFT (MIN 3 VIEWS)     Standing Status:   Future     Standing Expiration Date:   9/2/2021     Order Specific Question:   Reason for exam:     Answer:   twin Quick MD, Orthopedic Surgery (foot & ankle), [place]     Referral Priority:   Routine     Referral Type:   Eval and Treat     Referral Reason:   Specialty Services Required     Referred to Provider:   Zoe Alonso MD     Requested Specialty:   Orthopedic Surgery     Number of Visits Requested:   1     Orders Placed This Encounter   Medications    clotrimazole-betamethasone (LOTRISONE) 1-0.05 % cream     Sig: Apply topically 2 times daily. Dispense:  45 g     Refill:  0         Patient given educational materials - see patient instructions. Discussed use, benefit, and side effects of prescribedmedications. All patient questions answered. Pt voiced understanding. Reviewed health maintenance.   Instructed to continue current medications, diet and exercise. Patient agreed with treatment plan. Follow up as directed. I spent a total of 25 minutes face to face with this patient. Over 50% of that time was spent on counseling and care coordination. Please see assessment and plan for details. Electronically signed by Viet Salas MD on 9/7/2020 at 1:18 PM      Please note that this chart was generated using voice recognition Dragon dictation software. Although every effort was made to ensure the accuracy of this automatedtranscription, some errors in transcription may have occurred.

## 2020-09-14 RX ORDER — LABETALOL 200 MG/1
TABLET, FILM COATED ORAL
Qty: 180 TABLET | Refills: 0 | Status: SHIPPED | OUTPATIENT
Start: 2020-09-14 | End: 2020-12-08

## 2020-10-07 ENCOUNTER — OFFICE VISIT (OUTPATIENT)
Dept: ORTHOPEDIC SURGERY | Age: 40
End: 2020-10-07
Payer: COMMERCIAL

## 2020-10-07 VITALS — TEMPERATURE: 97.7 F | HEIGHT: 70 IN | BODY MASS INDEX: 26.34 KG/M2 | RESPIRATION RATE: 14 BRPM | WEIGHT: 184 LBS

## 2020-10-07 PROCEDURE — 99204 OFFICE O/P NEW MOD 45 MIN: CPT | Performed by: ORTHOPAEDIC SURGERY

## 2020-10-07 NOTE — LETTER
Dr. Rodney Falls 86 [address] [place]  [phone]        10/7/2020     Patient: Swathi Mayen. YOB: 1980    Dear Raissa Gomez MD,    I had the pleasure of seeing one of your patients, Swathi Mayen., recently in the office. Below are the relevant portions of my assessment and plan of care. 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 He also has a left chronic radial head dislocation. Notably, he has no relevant past medical history. I had a long discussion today with the patient about the likely diagnosis and its natural history, physical exam and imaging findings, as well as treatment options in detail. We discussed rest/activity modification, swelling control, NSAIDs/Acetaminophen/topical anesthetics, orthotics/shoewear modification, bracing/immobilization, injections, and physical therapy. Surgically, we discussed midfoot fusion in the future, if his symptoms are unable to be appropriately controlled with conservative management. At this point, the patient reports he is not willing to take time off work, after we discussed the postoperative course. We did discuss that the surgery is elective, and he will not make anything worse by continuing to work on this, but that he may have surgery at whatever time point he would like, as long as he can appropriately maintain his postoperative weightbearing restriction. The patient wishes to proceed with the recommendations as above. Orders/referrals were placed as below at today's visit. The patient was provided information on how to obtain an over-the-counter rocker-bottom shoe. I provided a prescription for Voltaren (4g TOPL q QID PRN pain).  I recommend this over the use of oral NSAIDs because given the patient's condition, the use of oral NSAIDs for an extended period of time will likely be necessary to help allow appropriate meaningful baseline function; and given the risk for development of side effects (GI bleeding/ulcers) with chronic use of oral anti-inflammatories, this is a much safer option. This is especially important in this situation given the patient's age as well. All questions were answered and the patient agrees with the above plan. The patient will return to clinic PRN. At his next visit, we will again discuss surgery as above. Thank you for allowing me to participate in the care of this patient. I look forward to serving you and your patients again in the future. Please don't hesitate to contact me at my mobile number .         Getachew Roque MD  Orthopedic Surgery, Foot and Ankle
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
## 2020-10-21 ENCOUNTER — OFFICE VISIT (OUTPATIENT)
Dept: ORTHOPEDIC SURGERY | Age: 40
End: 2020-10-21
Payer: COMMERCIAL

## 2020-10-21 VITALS — WEIGHT: 193 LBS | BODY MASS INDEX: 27.63 KG/M2 | HEIGHT: 70 IN

## 2020-10-21 PROBLEM — S53.005A DISLOCATION OF LEFT RADIAL HEAD: Status: ACTIVE | Noted: 2020-10-21

## 2020-10-21 PROCEDURE — 99213 OFFICE O/P EST LOW 20 MIN: CPT | Performed by: ORTHOPAEDIC SURGERY

## 2020-10-21 NOTE — PROGRESS NOTES
Orthopedic Elbow Encounter Note     Chief complaint: Left elbow pain    HPI: Nish White is a 45 y.o. right-hand dominant male who presents for evaluation of his left elbow. He states that as far back as he can remember he has not been able to fully straighten out his elbow and has had a prominence over the lateral aspect of the elbow. He states that sometimes it bothers him when he lifts heavily. His job is quite physical and does require a lot of heavy lifting but he is over the years been able to figure out ways to work around this. He also experiences some pain in his wrist if he tries to lift with his arm in supination because he has limited range of motion in this direction as well. He denies having any popping locking or catching. He also denies any feeling of instability. Previous treatment:    NSAIDs: Voltaren    Injections:  None    Physical therapy: No    Surgeries: None    Review of Systems:     Constitution: no fever or chills   Pain level: 4/10  Musculoskeletal: As noted in the HPI   Neurologic: no neurologic symptoms    Past Medical Hx, Past Surgical Hx, Medications, Allergies, Social Hx: These were all reviewed. Please refer to Electronic Medical Records for details.      Physical Exam:     Ht 5' 10\" (1.778 m)   Wt 193 lb (87.5 kg)   BMI 27.69 kg/m²    General Appearance: alert, well appearing, and in no distress  Mental Status: alert, oriented to person, place, and time  Gait: normal    Elbows:    Skin: warm and dry, no rash or erythema  Vasculature: 2+ radial pulses bilaterally  Sensation: Intact to light touch in radial, ulnar, and median nerve distributions bilaterally    ROM: (Degrees)    Right   A  Left   A     Flexion   140  Flexion   140   Extension  0  Extension  5    Pronation  75  Pronaton  45   Supination  75  Supination  65     Crepitation  No  Crepitation  No    Muscle strength:    Right       Left    Biceps   5    Biceps   5  Triceps  5    Triceps  5  Wrist flexion  5    Wrist flexion  5  Wrist extension 5    Wrist extension 5  Intrinsics  5    Intrinsics  5    Tenderness: None     Tenderness: None    Special tests    Right    Ulnar Nerve     Left  n    Cubital tunnel bend    n  n    Tinnel's at elbow    n        Biceps  n    Bicipital tenderness    n  n    Defect at biceps insertion   n        Instability  n    LCL instability     n  n    MCL instability     n  n    Moving valgus test    n  n    Milk sign     n  n    PLRI pivot     n        Epicondylitis  n    Resisted WE Pain    n  n    Resisted WF Pain    n  n    Resisted Supination Pain   n  n    Resisted Pronation Pain   n        Arthritis  n    Clunk      n  n    Pain at extremes of motion   n  n    Pain during arc of motion   n      Imaging:  X-rays of the left elbow completed on 10/7/2020 were reviewed independently demonstrating what appears to be a chronically posteriorly dislocated radial head with morphologic changes to the shape of the radial head as well as capitellum. The also appears to be bony extension of coronoid laterally. Impression/Plan:     Venus Duval is a 45 y.o. old male with a chronically dislocated left elbow radial head. Apparently this is always been present even as far back as when he was a kid. I had a discussion with the patient today with regards to this. He is interested in what his options may be surgically to see if this would allow him better range of motion as well as improved strength. I believe based on this issue his option surgically would include a radial head resection, radial head resection with arthroplasty, and a hemiarthroplasty. Given his age and activity level is not clear that any of these would reliably provide what he is looking for i.e. improved range of motion AND strength. Overall he seems to be doing well considering his injury. I believe it is due to the fact that again he has had this ever since he was a child.   At this time I did not offer any surgical intervention. He is going to continue on as is. I have him follow-up in my clinic as needed but he may return or call at anytime with questions under concerns.       NA = Not assessed  y = Yes  n = No

## 2020-11-24 RX ORDER — CLONIDINE HYDROCHLORIDE 0.1 MG/1
TABLET ORAL
Qty: 60 TABLET | Refills: 3 | Status: SHIPPED | OUTPATIENT
Start: 2020-11-24 | End: 2021-03-30

## 2020-11-24 RX ORDER — SIMVASTATIN 20 MG
TABLET ORAL
Qty: 90 TABLET | Refills: 2 | Status: SHIPPED | OUTPATIENT
Start: 2020-11-24 | End: 2021-08-04 | Stop reason: SDUPTHER

## 2020-12-02 ENCOUNTER — OFFICE VISIT (OUTPATIENT)
Dept: PRIMARY CARE CLINIC | Age: 40
End: 2020-12-02
Payer: COMMERCIAL

## 2020-12-02 VITALS
BODY MASS INDEX: 28.41 KG/M2 | TEMPERATURE: 97.3 F | OXYGEN SATURATION: 98 % | WEIGHT: 198 LBS | DIASTOLIC BLOOD PRESSURE: 70 MMHG | SYSTOLIC BLOOD PRESSURE: 138 MMHG | HEART RATE: 70 BPM | RESPIRATION RATE: 16 BRPM

## 2020-12-02 PROCEDURE — 99213 OFFICE O/P EST LOW 20 MIN: CPT | Performed by: INTERNAL MEDICINE

## 2020-12-02 ASSESSMENT — ENCOUNTER SYMPTOMS
NAUSEA: 0
BACK PAIN: 0
SINUS PRESSURE: 0
COUGH: 0
SORE THROAT: 0
SHORTNESS OF BREATH: 0
ABDOMINAL PAIN: 0
ABDOMINAL DISTENTION: 0
WHEEZING: 0
VOMITING: 0
CONSTIPATION: 0

## 2020-12-02 NOTE — PROGRESS NOTES
[facility]  [address]   [address]  [address] [address]  Dept: [phone]  Dept Fax: [phone]    Char Aguilar is a 45 y.o. male who presents today for his medical conditions/complaints as noted below. Chief Complaint   Patient presents with    Follow-up       HPI:     This is a 81-year-old male who is here for follow-up for essential hypertension, hyperlipidemia. He has been doing well with the current regimen and blood pressures well controlled. No complaints or concerns.       No results found for: LABA1C          ( goal A1C is < 7)   No results found for: LABMICR  LDL Cholesterol (mg/dL)   Date Value   03/05/2020 99       (goal LDL is <100)   BUN (mg/dL)   Date Value   03/05/2020 21 (H)     BP Readings from Last 3 Encounters:   12/02/20 138/70   09/02/20 130/82   05/21/20 116/78          (goal 120/80)    Past Medical History:   Diagnosis Date    Hyperlipidemia     Hypertension       Past Surgical History:   Procedure Laterality Date    FOOT SURGERY      TONSILLECTOMY AND ADENOIDECTOMY         Family History   Problem Relation Age of Onset    Heart Attack Maternal Cousin        Social History     Tobacco Use    Smoking status: Never Smoker    Smokeless tobacco: Never Used   Substance Use Topics    Alcohol use: Yes     Comment: rare       Current Outpatient Medications   Medication Sig Dispense Refill    simvastatin (ZOCOR) 20 MG tablet TAKE ONE TABLET BY MOUTH ONCE NIGHTLY 90 tablet 2    cloNIDine (CATAPRES) 0.1 MG tablet TAKE ONE TABLET BY MOUTH TWICE A DAY 60 tablet 3    labetalol (NORMODYNE) 200 MG tablet TAKE ONE TABLET BY MOUTH TWICE A  tablet 0    tadalafil (CIALIS) 20 MG tablet Take 1 tablet by mouth daily as needed for Erectile Dysfunction (Patient taking differently: Take 20 mg by mouth daily as needed for Erectile Dysfunction Indications: 5mg ) 30 tablet 1    losartan (COZAAR) 50 MG tablet Take 1 tablet by mouth daily 90 tablet 3    amLODIPine (NORVASC) 10 MG tablet Take 1 tablet by mouth daily 90 tablet 3    chlorthalidone (HYGROTON) 25 MG tablet Take 1 tablet by mouth daily 90 tablet 3     No current facility-administered medications for this visit. No Known Allergies    Health Maintenance   Topic Date Due    Varicella vaccine (1 of 2 - 2-dose childhood series) 04/30/1982    HIV screen  04/30/1996    DTaP/Tdap/Td vaccine (1 - Tdap) 09/02/2021 (Originally 4/30/2000)    Flu vaccine (1) 09/02/2021 (Originally 9/1/2020)    Lipid screen  03/05/2021    Potassium monitoring  03/05/2021    Creatinine monitoring  03/05/2021    Hepatitis A vaccine  Aged Out    Hepatitis B vaccine  Aged Out    Hib vaccine  Aged Out    Meningococcal (ACWY) vaccine  Aged Out    Pneumococcal 0-64 years Vaccine  Aged Out       Subjective:      Review of Systems   Constitutional: Negative for appetite change, chills, fatigue and fever. HENT: Negative for congestion, sinus pressure, sneezing and sore throat. Eyes: Negative for visual disturbance. Respiratory: Negative for cough, shortness of breath and wheezing. Cardiovascular: Negative for chest pain and palpitations. Gastrointestinal: Negative for abdominal distention, abdominal pain, constipation, nausea and vomiting. Endocrine: Negative for cold intolerance, heat intolerance, polydipsia, polyphagia and polyuria. Genitourinary: Negative for decreased urine volume, difficulty urinating and urgency. Musculoskeletal: Negative for back pain and joint swelling. Skin: Negative for rash. Neurological: Negative for dizziness and headaches. Psychiatric/Behavioral: Negative for sleep disturbance. The patient is not nervous/anxious. All other systems reviewed and are negative. Objective:     Physical Exam  Vitals signs and nursing note reviewed. Constitutional:       General: He is not in acute distress. Appearance: Normal appearance.  He is well-developed. He is not diaphoretic. HENT:      Head: Normocephalic and atraumatic. Right Ear: Hearing normal.      Left Ear: Hearing normal.      Mouth/Throat:      Pharynx: Uvula midline. Eyes:      General: No scleral icterus. Conjunctiva/sclera: Conjunctivae normal.      Pupils: Pupils are equal, round, and reactive to light. Neck:      Musculoskeletal: Full passive range of motion without pain and normal range of motion. Thyroid: No thyromegaly. Vascular: No JVD. Trachea: Phonation normal.   Cardiovascular:      Rate and Rhythm: Normal rate and regular rhythm. Pulses: Normal pulses. Carotid pulses are 2+ on the right side and 2+ on the left side. Radial pulses are 2+ on the right side and 2+ on the left side. Heart sounds: Normal heart sounds. No murmur. Pulmonary:      Effort: Pulmonary effort is normal. No accessory muscle usage or respiratory distress. Breath sounds: Normal breath sounds. No wheezing or rales. Abdominal:      General: Bowel sounds are normal. There is no distension. Palpations: Abdomen is soft. Tenderness: There is no abdominal tenderness. There is no rebound. Musculoskeletal: Normal range of motion. General: No deformity. Lymphadenopathy:      Cervical: No cervical adenopathy. Skin:     General: Skin is warm. Capillary Refill: Capillary refill takes less than 2 seconds. Findings: No rash. Nails: There is no clubbing. Neurological:      Mental Status: He is alert and oriented to person, place, and time. Sensory: No sensory deficit. Psychiatric:         Speech: Speech normal.         Behavior: Behavior normal.       /70   Pulse 70   Temp 97.3 °F (36.3 °C) (Temporal)   Resp 16   Wt 198 lb (89.8 kg)   SpO2 98%   BMI 28.41 kg/m²     Assessment:          1. Essential hypertension  Well controlled    2. Mixed hyperlipidemia  zocor             Diagnosis Orders   1. Essential hypertension     2. Mixed hyperlipidemia             Plan:      Return in about 3 months (around 3/2/2021) for Routine follow-up. No orders of the defined types were placed in this encounter. No orders of the defined types were placed in this encounter. Patient given educational materials - see patient instructions. Discussed use, benefit, and side effects of prescribedmedications. All patient questions answered. Pt voiced understanding. Reviewed health maintenance. Instructed to continue current medications, diet and exercise. Patient agreed with treatment plan. Follow up as directed. I spent a total of 15 minutes face to face with this patient. Over 50% of that time was spent on counseling and care coordination. Please see assessment and plan for details. Electronically signed by Rebecca Zamudio MD on 12/2/2020 at 8:50 AM      Please note that this chart was generated using voice recognition Dragon dictation software. Although every effort was made to ensure the accuracy of this automatedtranscription, some errors in transcription may have occurred.

## 2020-12-08 RX ORDER — LABETALOL 200 MG/1
TABLET, FILM COATED ORAL
Qty: 180 TABLET | Refills: 0 | Status: SHIPPED | OUTPATIENT
Start: 2020-12-08 | End: 2021-02-23

## 2020-12-08 NOTE — TELEPHONE ENCOUNTER
Last OV 12/02/2020    Health Maintenance   Topic Date Due    Varicella vaccine (1 of 2 - 2-dose childhood series) 04/30/1982    HIV screen  04/30/1996    DTaP/Tdap/Td vaccine (1 - Tdap) 09/02/2021 (Originally 4/30/2000)    Flu vaccine (1) 09/02/2021 (Originally 9/1/2020)    Lipid screen  03/05/2021    Potassium monitoring  03/05/2021    Creatinine monitoring  03/05/2021    Hepatitis A vaccine  Aged Out    Hepatitis B vaccine  Aged Out    Hib vaccine  Aged Out    Meningococcal (ACWY) vaccine  Aged Out    Pneumococcal 0-64 years Vaccine  Aged Out             (applicable per patient's age: Cancer Screenings, Depression Screening, Fall Risk Screening, Immunizations)    LDL Cholesterol (mg/dL)   Date Value   03/05/2020 99     BUN (mg/dL)   Date Value   03/05/2020 21 (H)      (goal A1C is < 7)   (goal LDL is <100) need 30-50% reduction from baseline     BP Readings from Last 3 Encounters:   12/02/20 138/70   09/02/20 130/82   05/21/20 116/78    (goal /80)      All Future Testing planned in [facility]:  Lab Frequency Next Occurrence   XR FOOT LEFT (MIN 3 VIEWS) Once 09/02/2021       Next Visit Date:  Future Appointments   Date Time Provider Carmela Lauren   3/12/2021  8:30 AM Rebecca Zamudio MD [facility]            Patient Active Problem List:     Essential hypertension     Mixed hyperlipidemia     Erectile dysfunction     Hx of fracture of foot     Dislocation of left radial head

## 2020-12-29 RX ORDER — CHLORTHALIDONE 25 MG/1
TABLET ORAL
Qty: 90 TABLET | Refills: 2 | Status: SHIPPED | OUTPATIENT
Start: 2020-12-29 | End: 2021-08-04 | Stop reason: SDUPTHER

## 2021-01-04 DIAGNOSIS — I10 ESSENTIAL HYPERTENSION: ICD-10-CM

## 2021-01-04 NOTE — TELEPHONE ENCOUNTER
LOV 12/2/20  NOV 3/12/21    Health Maintenance   Topic Date Due    Hepatitis C screen  1981    Varicella vaccine (1 of 2 - 2-dose childhood series) 04/30/1982    HIV screen  04/30/1996    DTaP/Tdap/Td vaccine (1 - Tdap) 09/02/2021 (Originally 4/30/2000)    Flu vaccine (1) 09/02/2021 (Originally 9/1/2020)    Lipid screen  03/05/2021    Potassium monitoring  03/05/2021    Creatinine monitoring  03/05/2021    Hepatitis A vaccine  Aged Out    Hepatitis B vaccine  Aged Out    Hib vaccine  Aged Out    Meningococcal (ACWY) vaccine  Aged Out    Pneumococcal 0-64 years Vaccine  Aged Out             (applicable per patient's age: Cancer Screenings, Depression Screening, Fall Risk Screening, Immunizations)    LDL Cholesterol (mg/dL)   Date Value   03/05/2020 99     BUN (mg/dL)   Date Value   03/05/2020 21 (H)      (goal A1C is < 7)   (goal LDL is <100) need 30-50% reduction from baseline     BP Readings from Last 3 Encounters:   12/02/20 138/70   09/02/20 130/82   05/21/20 116/78    (goal /80)      All Future Testing planned in [facility]:  Lab Frequency Next Occurrence   XR FOOT LEFT (MIN 3 VIEWS) Once 09/02/2021       Next Visit Date:  Future Appointments   Date Time Provider Carmela Lauren   3/12/2021  8:30 AM MD Martine Mckeon            Patient Active Problem List:     Essential hypertension     Mixed hyperlipidemia     Erectile dysfunction     Hx of fracture of foot     Dislocation of left radial head

## 2021-01-06 RX ORDER — AMLODIPINE BESYLATE 10 MG/1
TABLET ORAL
Qty: 90 TABLET | Refills: 2 | Status: SHIPPED | OUTPATIENT
Start: 2021-01-06 | End: 2021-08-04 | Stop reason: SDUPTHER

## 2021-02-01 DIAGNOSIS — I10 ESSENTIAL HYPERTENSION: ICD-10-CM

## 2021-02-02 RX ORDER — LOSARTAN POTASSIUM 50 MG/1
TABLET ORAL
Qty: 90 TABLET | Refills: 2 | Status: SHIPPED | OUTPATIENT
Start: 2021-02-02 | End: 2021-08-04 | Stop reason: SDUPTHER

## 2021-02-02 NOTE — TELEPHONE ENCOUNTER
Last OV 12/02/2020    Next OV 03/12/2021    Health Maintenance   Topic Date Due    Hepatitis C screen  1980    Varicella vaccine (1 of 2 - 2-dose childhood series) 04/30/1982    HIV screen  04/30/1996    DTaP/Tdap/Td vaccine (1 - Tdap) 09/02/2021 (Originally 4/30/2000)    Flu vaccine (1) 09/02/2021 (Originally 9/1/2020)    Lipid screen  03/05/2021    Potassium monitoring  03/05/2021    Creatinine monitoring  03/05/2021    Hepatitis A vaccine  Aged Out    Hepatitis B vaccine  Aged Out    Hib vaccine  Aged Out    Meningococcal (ACWY) vaccine  Aged Out    Pneumococcal 0-64 years Vaccine  Aged Out             (applicable per patient's age: Cancer Screenings, Depression Screening, Fall Risk Screening, Immunizations)    LDL Cholesterol (mg/dL)   Date Value   03/05/2020 99     BUN (mg/dL)   Date Value   03/05/2020 21 (H)      (goal A1C is < 7)   (goal LDL is <100) need 30-50% reduction from baseline     BP Readings from Last 3 Encounters:   12/02/20 138/70   09/02/20 130/82   05/21/20 116/78    (goal /80)      All Future Testing planned in [facility]:  Lab Frequency Next Occurrence   XR FOOT LEFT (MIN 3 VIEWS) Once 09/02/2021       Next Visit Date:  Future Appointments   Date Time Provider Carmela Lauren   3/12/2021  8:30 AM Ferne Goltz, MD [facility]            Patient Active Problem List:     Essential hypertension     Mixed hyperlipidemia     Erectile dysfunction     Hx of fracture of foot     Dislocation of left radial head

## 2021-02-17 RX ORDER — TADALAFIL 5 MG/1
5 TABLET ORAL DAILY
Qty: 30 TABLET | Refills: 0 | Status: SHIPPED | OUTPATIENT
Start: 2021-02-17 | End: 2021-03-30

## 2021-02-21 DIAGNOSIS — I10 ESSENTIAL HYPERTENSION: ICD-10-CM

## 2021-02-23 RX ORDER — LABETALOL 200 MG/1
TABLET, FILM COATED ORAL
Qty: 180 TABLET | Refills: 0 | Status: SHIPPED | OUTPATIENT
Start: 2021-02-23 | End: 2021-03-12 | Stop reason: SDUPTHER

## 2021-03-12 ENCOUNTER — HOSPITAL ENCOUNTER (OUTPATIENT)
Age: 41
Setting detail: SPECIMEN
Discharge: HOME | End: 2021-03-12
Payer: COMMERCIAL

## 2021-03-12 ENCOUNTER — OFFICE VISIT (OUTPATIENT)
Dept: PRIMARY CARE CLINIC | Age: 41
End: 2021-03-12
Payer: COMMERCIAL

## 2021-03-12 VITALS
TEMPERATURE: 97 F | RESPIRATION RATE: 16 BRPM | SYSTOLIC BLOOD PRESSURE: 135 MMHG | DIASTOLIC BLOOD PRESSURE: 85 MMHG | OXYGEN SATURATION: 99 % | HEART RATE: 78 BPM | BODY MASS INDEX: 28.52 KG/M2 | HEIGHT: 70 IN | WEIGHT: 199.2 LBS

## 2021-03-12 DIAGNOSIS — E53.8 VITAMIN B12 DEFICIENCY: ICD-10-CM

## 2021-03-12 DIAGNOSIS — E78.2 MIXED HYPERLIPIDEMIA: ICD-10-CM

## 2021-03-12 DIAGNOSIS — E55.9 VITAMIN D DEFICIENCY: ICD-10-CM

## 2021-03-12 DIAGNOSIS — R53.82 CHRONIC FATIGUE: ICD-10-CM

## 2021-03-12 DIAGNOSIS — R63.1 INCREASED THIRST: ICD-10-CM

## 2021-03-12 DIAGNOSIS — Z00.00 ANNUAL PHYSICAL EXAM: Primary | ICD-10-CM

## 2021-03-12 DIAGNOSIS — N52.9 ERECTILE DYSFUNCTION, UNSPECIFIED ERECTILE DYSFUNCTION TYPE: ICD-10-CM

## 2021-03-12 DIAGNOSIS — Z00.00 ANNUAL PHYSICAL EXAM: ICD-10-CM

## 2021-03-12 DIAGNOSIS — I10 ESSENTIAL HYPERTENSION: ICD-10-CM

## 2021-03-12 LAB
ABSOLUTE EOS #: 0.32 K/UL (ref 0–0.44)
ABSOLUTE IMMATURE GRANULOCYTE: <0.03 K/UL (ref 0–0.3)
ABSOLUTE LYMPH #: 1.1 K/UL (ref 1.1–3.7)
ABSOLUTE MONO #: 0.45 K/UL (ref 0.1–1.2)
ALBUMIN SERPL-MCNC: 4.7 G/DL (ref 3.5–5.2)
ALBUMIN/GLOBULIN RATIO: 2.1 (ref 1–2.5)
ALP BLD-CCNC: 62 U/L (ref 40–129)
ALT SERPL-CCNC: 27 U/L (ref 5–41)
ANION GAP SERPL CALCULATED.3IONS-SCNC: 10 MMOL/L (ref 9–17)
AST SERPL-CCNC: 17 U/L
BASOPHILS # BLD: 1 % (ref 0–2)
BASOPHILS ABSOLUTE: 0.04 K/UL (ref 0–0.2)
BILIRUB SERPL-MCNC: 0.61 MG/DL (ref 0.3–1.2)
BUN BLDV-MCNC: 20 MG/DL (ref 6–20)
BUN/CREAT BLD: NORMAL (ref 9–20)
CALCIUM SERPL-MCNC: 9.5 MG/DL (ref 8.6–10.4)
CHLORIDE BLD-SCNC: 100 MMOL/L (ref 98–107)
CHOLESTEROL/HDL RATIO: 2.9
CHOLESTEROL: 190 MG/DL
CO2: 30 MMOL/L (ref 20–31)
CREAT SERPL-MCNC: 0.86 MG/DL (ref 0.7–1.2)
DIFFERENTIAL TYPE: ABNORMAL
EOSINOPHILS RELATIVE PERCENT: 7 % (ref 1–4)
ESTIMATED AVERAGE GLUCOSE: 105 MG/DL
FOLATE: 11.2 NG/ML
GFR AFRICAN AMERICAN: >60 ML/MIN
GFR NON-AFRICAN AMERICAN: >60 ML/MIN
GFR SERPL CREATININE-BSD FRML MDRD: NORMAL ML/MIN/{1.73_M2}
GFR SERPL CREATININE-BSD FRML MDRD: NORMAL ML/MIN/{1.73_M2}
GLUCOSE BLD-MCNC: 99 MG/DL (ref 70–99)
HBA1C MFR BLD: 5.3 % (ref 4–6)
HCT VFR BLD CALC: 46.5 % (ref 40.7–50.3)
HDLC SERPL-MCNC: 65 MG/DL
HEMOGLOBIN: 15.7 G/DL (ref 13–17)
IMMATURE GRANULOCYTES: 1 %
LDL CHOLESTEROL: 116 MG/DL (ref 0–130)
LYMPHOCYTES # BLD: 25 % (ref 24–43)
MCH RBC QN AUTO: 32.2 PG (ref 25.2–33.5)
MCHC RBC AUTO-ENTMCNC: 33.8 G/DL (ref 28.4–34.8)
MCV RBC AUTO: 95.5 FL (ref 82.6–102.9)
MONOCYTES # BLD: 10 % (ref 3–12)
NRBC AUTOMATED: 0 PER 100 WBC
PDW BLD-RTO: 12.1 % (ref 11.8–14.4)
PLATELET # BLD: 198 K/UL (ref 138–453)
PLATELET ESTIMATE: ABNORMAL
PMV BLD AUTO: 10.7 FL (ref 8.1–13.5)
POTASSIUM SERPL-SCNC: 4.1 MMOL/L (ref 3.7–5.3)
RBC # BLD: 4.87 M/UL (ref 4.21–5.77)
RBC # BLD: ABNORMAL 10*6/UL
SEG NEUTROPHILS: 56 % (ref 36–65)
SEGMENTED NEUTROPHILS ABSOLUTE COUNT: 2.42 K/UL (ref 1.5–8.1)
SODIUM BLD-SCNC: 140 MMOL/L (ref 135–144)
TOTAL PROTEIN: 6.9 G/DL (ref 6.4–8.3)
TRIGL SERPL-MCNC: 47 MG/DL
TSH SERPL DL<=0.05 MIU/L-ACNC: 1.01 MIU/L (ref 0.3–5)
VITAMIN B-12: 605 PG/ML (ref 232–1245)
VITAMIN D 25-HYDROXY: 21.9 NG/ML (ref 30–100)
VLDLC SERPL CALC-MCNC: NORMAL MG/DL (ref 1–30)
WBC # BLD: 4.4 K/UL (ref 3.5–11.3)
WBC # BLD: ABNORMAL 10*3/UL

## 2021-03-12 PROCEDURE — 99395 PREV VISIT EST AGE 18-39: CPT | Performed by: INTERNAL MEDICINE

## 2021-03-12 RX ORDER — TADALAFIL 20 MG/1
20 TABLET ORAL DAILY PRN
Qty: 30 TABLET | Refills: 1 | Status: SHIPPED | OUTPATIENT
Start: 2021-03-12 | End: 2021-08-04 | Stop reason: SDUPTHER

## 2021-03-12 RX ORDER — LABETALOL 200 MG/1
TABLET, FILM COATED ORAL
Qty: 180 TABLET | Refills: 1 | Status: SHIPPED | OUTPATIENT
Start: 2021-03-12 | End: 2021-08-04 | Stop reason: SDUPTHER

## 2021-03-12 ASSESSMENT — ENCOUNTER SYMPTOMS
WHEEZING: 0
BACK PAIN: 0
SHORTNESS OF BREATH: 0
SORE THROAT: 0
CONSTIPATION: 0
ABDOMINAL PAIN: 0
COUGH: 0
NAUSEA: 0
ABDOMINAL DISTENTION: 0
VOMITING: 0
SINUS PRESSURE: 0

## 2021-03-12 NOTE — PROGRESS NOTES
[facility]  [address]   [address]  [address] [address]  Dept: [phone]  Dept Fax: [phone]    Krysten Bedolla is a 45 y.o. male who presents today for his medical conditions/complaints as noted below. Chief Complaint   Patient presents with    Hypertension     routine follow up htn. HPI:     This is a 71-year-old male who is here for regular follow-up and annual physical.  He is currently fasting. Blood pressures well controlled and needs refills. No other complaints or concerns except for chronic fatigue and vitamin deficiencies.       No results found for: LABA1C          ( goal A1C is < 7)   No results found for: LABMICR  LDL Cholesterol (mg/dL)   Date Value   03/05/2020 99       (goal LDL is <100)   BUN (mg/dL)   Date Value   03/05/2020 21 (H)     BP Readings from Last 3 Encounters:   03/12/21 135/85   12/02/20 138/70   09/02/20 130/82          (goal 120/80)    Past Medical History:   Diagnosis Date    Hyperlipidemia     Hypertension       Past Surgical History:   Procedure Laterality Date    FOOT SURGERY      TONSILLECTOMY AND ADENOIDECTOMY         Family History   Problem Relation Age of Onset    Heart Attack Maternal Cousin        Social History     Tobacco Use    Smoking status: Never Smoker    Smokeless tobacco: Never Used   Substance Use Topics    Alcohol use: Yes     Comment: rare       Current Outpatient Medications   Medication Sig Dispense Refill    labetalol (NORMODYNE) 200 MG tablet TAKE ONE TABLET BY MOUTH TWICE A  tablet 1    tadalafil (CIALIS) 20 MG tablet Take 1 tablet by mouth daily as needed for Erectile Dysfunction 30 tablet 1    tadalafil (CIALIS) 5 MG tablet Take 1 tablet by mouth daily 30 tablet 0    losartan (COZAAR) 50 MG tablet TAKE ONE TABLET BY MOUTH DAILY 90 tablet 2    amLODIPine (NORVASC) 10 MG tablet TAKE ONE TABLET BY MOUTH DAILY 90 tablet 2    chlorthalidone (HYGROTON) 25 MG tablet TAKE ONE TABLET BY MOUTH DAILY 90 tablet 2    simvastatin (ZOCOR) 20 MG tablet TAKE ONE TABLET BY MOUTH ONCE NIGHTLY 90 tablet 2    cloNIDine (CATAPRES) 0.1 MG tablet TAKE ONE TABLET BY MOUTH TWICE A DAY 60 tablet 3     No current facility-administered medications for this visit. No Known Allergies    Health Maintenance   Topic Date Due    Hepatitis C screen  Never done    Varicella vaccine (1 of 2 - 2-dose childhood series) Never done    HIV screen  Never done    Potassium monitoring  03/05/2021    Creatinine monitoring  03/05/2021    Lipid screen  03/05/2021    DTaP/Tdap/Td vaccine (1 - Tdap) 09/02/2021 (Originally 4/30/2000)    Flu vaccine (1) 09/02/2021 (Originally 9/1/2020)    Hepatitis A vaccine  Aged Out    Hepatitis B vaccine  Aged Out    Hib vaccine  Aged Out    Meningococcal (ACWY) vaccine  Aged Out    Pneumococcal 0-64 years Vaccine  Aged Out       Subjective:      Review of Systems   Constitutional: Negative for appetite change, chills, fatigue and fever. HENT: Negative for congestion, sinus pressure, sneezing and sore throat. Eyes: Negative for visual disturbance. Respiratory: Negative for cough, shortness of breath and wheezing. Cardiovascular: Negative for chest pain and palpitations. Gastrointestinal: Negative for abdominal distention, abdominal pain, constipation, nausea and vomiting. Endocrine: Negative for cold intolerance, heat intolerance, polydipsia, polyphagia and polyuria. Genitourinary: Negative for decreased urine volume, difficulty urinating and urgency. Musculoskeletal: Negative for back pain and joint swelling. Skin: Negative for rash. Neurological: Negative for dizziness and headaches. Psychiatric/Behavioral: Negative for sleep disturbance. The patient is not nervous/anxious. All other systems reviewed and are negative. Objective:     Physical Exam  Vitals signs and nursing note reviewed.    Constitutional:       General: He is not in acute distress. Appearance: Normal appearance. He is well-developed. He is not diaphoretic. HENT:      Head: Normocephalic and atraumatic. Right Ear: Hearing normal.      Left Ear: Hearing normal.      Mouth/Throat:      Pharynx: Uvula midline. Eyes:      General: No scleral icterus. Conjunctiva/sclera: Conjunctivae normal.      Pupils: Pupils are equal, round, and reactive to light. Neck:      Musculoskeletal: Full passive range of motion without pain and normal range of motion. Thyroid: No thyromegaly. Vascular: No JVD. Trachea: Phonation normal.   Cardiovascular:      Rate and Rhythm: Normal rate and regular rhythm. Pulses: Normal pulses. Carotid pulses are 2+ on the right side and 2+ on the left side. Radial pulses are 2+ on the right side and 2+ on the left side. Heart sounds: Normal heart sounds. No murmur. Pulmonary:      Effort: Pulmonary effort is normal. No accessory muscle usage or respiratory distress. Breath sounds: Normal breath sounds. No wheezing or rales. Abdominal:      General: Bowel sounds are normal. There is no distension. Palpations: Abdomen is soft. Tenderness: There is no abdominal tenderness. There is no rebound. Musculoskeletal: Normal range of motion. General: No deformity. Lymphadenopathy:      Cervical: No cervical adenopathy. Skin:     General: Skin is warm. Capillary Refill: Capillary refill takes less than 2 seconds. Findings: No rash. Nails: There is no clubbing. Neurological:      Mental Status: He is alert and oriented to person, place, and time. Sensory: No sensory deficit.    Psychiatric:         Speech: Speech normal.         Behavior: Behavior normal.       /85 (Site: Left Upper Arm, Position: Sitting, Cuff Size: Medium Adult)   Pulse 78   Temp 97 °F (36.1 °C) (Oral)   Resp 16   Ht 5' 10\" (1.778 m)   Wt 199 lb 3.2 oz (90.4 kg)   SpO2 99%   BMI 28.58 kg/m²     Assessment:          1. Annual physical exam    - CBC Auto Differential; Future    2. Erectile dysfunction, unspecified erectile dysfunction type    - tadalafil (CIALIS) 20 MG tablet; Take 1 tablet by mouth daily as needed for Erectile Dysfunction  Dispense: 30 tablet; Refill: 1    3. Essential hypertension    - labetalol (NORMODYNE) 200 MG tablet; TAKE ONE TABLET BY MOUTH TWICE A DAY  Dispense: 180 tablet; Refill: 1  - Comprehensive Metabolic Panel; Future    4. Mixed hyperlipidemia    - Lipid Panel; Future    5. Chronic fatigue    - TSH with Reflex; Future    6. Vitamin D deficiency    - Vitamin D 25 Hydroxy; Future    7. Vitamin B12 deficiency    - Vitamin B12 & Folate; Future    8. Increased thirst    - Hemoglobin A1C; Future            Diagnosis Orders   1. Annual physical exam  CBC Auto Differential   2. Erectile dysfunction, unspecified erectile dysfunction type  tadalafil (CIALIS) 20 MG tablet   3. Essential hypertension  labetalol (NORMODYNE) 200 MG tablet    Comprehensive Metabolic Panel   4. Mixed hyperlipidemia  Lipid Panel   5. Chronic fatigue  TSH with Reflex   6. Vitamin D deficiency  Vitamin D 25 Hydroxy   7. Vitamin B12 deficiency  Vitamin B12 & Folate   8. Increased thirst  Hemoglobin A1C           Plan:      Return in about 3 months (around 6/12/2021) for Routine follow-up. Orders Placed This Encounter   Procedures    CBC Auto Differential     Standing Status:   Future     Standing Expiration Date:   3/12/2022    TSH with Reflex     Standing Status:   Future     Standing Expiration Date:   3/12/2022    Lipid Panel     Standing Status:   Future     Standing Expiration Date:   6/12/2021     Order Specific Question:   Is Patient Fasting?/# of Hours     Answer:    Fast 8-10 hours    Comprehensive Metabolic Panel     Standing Status:   Future     Standing Expiration Date:   3/12/2022    Vitamin B12 & Folate     Standing Status:   Future     Standing Expiration Date:   3/12/2022  Vitamin D 25 Hydroxy     Standing Status:   Future     Standing Expiration Date:   3/12/2022    Hemoglobin A1C     Standing Status:   Future     Standing Expiration Date:   3/12/2022     Orders Placed This Encounter   Medications    labetalol (NORMODYNE) 200 MG tablet     Sig: TAKE ONE TABLET BY MOUTH TWICE A DAY     Dispense:  180 tablet     Refill:  1    tadalafil (CIALIS) 20 MG tablet     Sig: Take 1 tablet by mouth daily as needed for Erectile Dysfunction     Dispense:  30 tablet     Refill:  1         Patient given educational materials - see patient instructions. Discussed use, benefit, and side effects of prescribedmedications. All patient questions answered. Pt voiced understanding. Reviewed health maintenance. Instructed to continue current medications, diet and exercise. Patient agreed with treatment plan. Follow up as directed. I spent a total of 25 minutes face to face with this patient. Over 50% of that time was spent on counseling and care coordination. Please see assessment and plan for details. Electronically signed by Nessa De Leon MD on 3/12/2021 at 8:53 AM      Please note that this chart was generated using voice recognition Dragon dictation software. Although every effort was made to ensure the accuracy of this automatedtranscription, some errors in transcription may have occurred.

## 2021-03-18 ENCOUNTER — TELEPHONE (OUTPATIENT)
Dept: PRIMARY CARE CLINIC | Age: 41
End: 2021-03-18

## 2021-03-28 DIAGNOSIS — I10 ESSENTIAL HYPERTENSION: ICD-10-CM

## 2021-03-30 RX ORDER — CLONIDINE HYDROCHLORIDE 0.1 MG/1
TABLET ORAL
Qty: 60 TABLET | Refills: 2 | Status: SHIPPED | OUTPATIENT
Start: 2021-03-30 | End: 2021-07-19 | Stop reason: SDUPTHER

## 2021-03-30 RX ORDER — TADALAFIL 5 MG/1
TABLET ORAL
Qty: 30 TABLET | Refills: 0 | Status: SHIPPED | OUTPATIENT
Start: 2021-03-30 | End: 2021-08-04 | Stop reason: SDUPTHER

## 2021-04-21 ENCOUNTER — TELEPHONE (OUTPATIENT)
Dept: PRIMARY CARE CLINIC | Age: 41
End: 2021-04-21

## 2021-04-21 NOTE — TELEPHONE ENCOUNTER
Spoke with patient about provider leaving. Pt asked who Dr. Dillon Riley referral for a new MD is, Yoshi Benavides stated our office may be hiring a new MD, and gave options of providers in out practice. Pt stated he will call and r/s.

## 2021-07-19 DIAGNOSIS — I10 ESSENTIAL HYPERTENSION: ICD-10-CM

## 2021-07-19 RX ORDER — CLONIDINE HYDROCHLORIDE 0.1 MG/1
TABLET ORAL
Qty: 60 TABLET | Refills: 2 | Status: SHIPPED | OUTPATIENT
Start: 2021-07-19 | End: 2021-10-22

## 2021-08-04 ENCOUNTER — OFFICE VISIT (OUTPATIENT)
Dept: PRIMARY CARE CLINIC | Age: 41
End: 2021-08-04
Payer: COMMERCIAL

## 2021-08-04 VITALS
BODY MASS INDEX: 28.07 KG/M2 | WEIGHT: 195.6 LBS | OXYGEN SATURATION: 98 % | DIASTOLIC BLOOD PRESSURE: 82 MMHG | RESPIRATION RATE: 16 BRPM | HEART RATE: 66 BPM | SYSTOLIC BLOOD PRESSURE: 134 MMHG

## 2021-08-04 DIAGNOSIS — E78.2 MIXED HYPERLIPIDEMIA: ICD-10-CM

## 2021-08-04 DIAGNOSIS — N52.9 ERECTILE DYSFUNCTION, UNSPECIFIED ERECTILE DYSFUNCTION TYPE: ICD-10-CM

## 2021-08-04 DIAGNOSIS — I10 RESISTANT HYPERTENSION: Primary | ICD-10-CM

## 2021-08-04 DIAGNOSIS — E66.3 OVERWEIGHT (BMI 25.0-29.9): ICD-10-CM

## 2021-08-04 DIAGNOSIS — E55.9 VITAMIN D DEFICIENCY: ICD-10-CM

## 2021-08-04 PROBLEM — I1A.0 RESISTANT HYPERTENSION: Status: ACTIVE | Noted: 2021-08-04

## 2021-08-04 PROBLEM — S53.005A DISLOCATION OF LEFT RADIAL HEAD: Status: RESOLVED | Noted: 2020-10-21 | Resolved: 2021-08-04

## 2021-08-04 PROBLEM — Z87.81 HX OF FRACTURE OF FOOT: Status: RESOLVED | Noted: 2020-09-02 | Resolved: 2021-08-04

## 2021-08-04 PROCEDURE — 99213 OFFICE O/P EST LOW 20 MIN: CPT | Performed by: STUDENT IN AN ORGANIZED HEALTH CARE EDUCATION/TRAINING PROGRAM

## 2021-08-04 RX ORDER — SIMVASTATIN 20 MG
TABLET ORAL
Qty: 90 TABLET | Refills: 1 | Status: SHIPPED | OUTPATIENT
Start: 2021-08-04 | End: 2022-02-25

## 2021-08-04 RX ORDER — ERGOCALCIFEROL 1.25 MG/1
50000 CAPSULE ORAL WEEKLY
Qty: 4 CAPSULE | Refills: 5 | Status: SHIPPED | OUTPATIENT
Start: 2021-08-04 | End: 2022-05-04 | Stop reason: SDUPTHER

## 2021-08-04 RX ORDER — TADALAFIL 20 MG/1
20 TABLET ORAL DAILY PRN
Qty: 90 TABLET | Refills: 1 | Status: SHIPPED | OUTPATIENT
Start: 2021-08-04

## 2021-08-04 RX ORDER — AMLODIPINE BESYLATE 10 MG/1
TABLET ORAL
Qty: 90 TABLET | Refills: 1 | Status: SHIPPED | OUTPATIENT
Start: 2021-08-04 | End: 2022-04-04

## 2021-08-04 RX ORDER — LABETALOL 200 MG/1
TABLET, FILM COATED ORAL
Qty: 180 TABLET | Refills: 1 | Status: SHIPPED | OUTPATIENT
Start: 2021-08-04 | End: 2022-06-06

## 2021-08-04 RX ORDER — LOSARTAN POTASSIUM 50 MG/1
TABLET ORAL
Qty: 90 TABLET | Refills: 1 | Status: SHIPPED | OUTPATIENT
Start: 2021-08-04 | End: 2021-11-05 | Stop reason: SDUPTHER

## 2021-08-04 RX ORDER — TADALAFIL 5 MG/1
TABLET ORAL
Qty: 90 TABLET | Refills: 1 | Status: SHIPPED | OUTPATIENT
Start: 2021-08-04 | End: 2022-07-05

## 2021-08-04 RX ORDER — CHLORTHALIDONE 25 MG/1
TABLET ORAL
Qty: 90 TABLET | Refills: 1 | Status: SHIPPED | OUTPATIENT
Start: 2021-08-04 | End: 2022-04-04

## 2021-08-04 SDOH — ECONOMIC STABILITY: FOOD INSECURITY: WITHIN THE PAST 12 MONTHS, YOU WORRIED THAT YOUR FOOD WOULD RUN OUT BEFORE YOU GOT MONEY TO BUY MORE.: NEVER TRUE

## 2021-08-04 SDOH — ECONOMIC STABILITY: FOOD INSECURITY: WITHIN THE PAST 12 MONTHS, THE FOOD YOU BOUGHT JUST DIDN'T LAST AND YOU DIDN'T HAVE MONEY TO GET MORE.: NEVER TRUE

## 2021-08-04 ASSESSMENT — PATIENT HEALTH QUESTIONNAIRE - PHQ9
SUM OF ALL RESPONSES TO PHQ QUESTIONS 1-9: 0
SUM OF ALL RESPONSES TO PHQ9 QUESTIONS 1 & 2: 0
SUM OF ALL RESPONSES TO PHQ QUESTIONS 1-9: 0
1. LITTLE INTEREST OR PLEASURE IN DOING THINGS: 0
SUM OF ALL RESPONSES TO PHQ QUESTIONS 1-9: 0
2. FEELING DOWN, DEPRESSED OR HOPELESS: 0

## 2021-08-04 ASSESSMENT — ENCOUNTER SYMPTOMS
SHORTNESS OF BREATH: 0
ABDOMINAL DISTENTION: 0
COUGH: 0
EYE DISCHARGE: 0
RHINORRHEA: 0
ABDOMINAL PAIN: 0
WHEEZING: 0
BACK PAIN: 0
EYE ITCHING: 0

## 2021-08-04 ASSESSMENT — SOCIAL DETERMINANTS OF HEALTH (SDOH): HOW HARD IS IT FOR YOU TO PAY FOR THE VERY BASICS LIKE FOOD, HOUSING, MEDICAL CARE, AND HEATING?: NOT HARD AT ALL

## 2021-08-04 NOTE — PROGRESS NOTES
[facility]  [address]   [address]  [address] [address]  Dept: [phone]  Dept Fax: [phone]    Jess Ortiz is a 37 y.o. male who presents today for his medical conditions/complaints as noted below. Chief Complaint   Patient presents with    Established New Doctor       HPI:     37 y.o. male who came to the office as a follow-up visit for medication refills. He denied any current complaints. His overall health has been good. He has history of resistant hypertension for which he is on multiple medications. His blood pressure is normal this visit. Patient mentioned that he had full work-up done in the past for his high blood pressure when he was young. He is on Zocor for hyperlipidemia. He has history of erectile dysfunction and BPH for which he is on tadalafil    He is overweight. Advised weight loss and lifestyle changes. No other complaints. Medications reviewed and refilled as appropriate, problem list updated. All concerns discussed in details and all questions answered to patient satisfaction.             Hemoglobin A1C (%)   Date Value   03/12/2021 5.3             ( goal A1C is < 7)   No results found for: LABMICR  LDL Cholesterol (mg/dL)   Date Value   03/12/2021 116   03/05/2020 99       (goal LDL is <100)   AST (U/L)   Date Value   03/12/2021 17     ALT (U/L)   Date Value   03/12/2021 27     BUN (mg/dL)   Date Value   03/12/2021 20     BP Readings from Last 3 Encounters:   08/04/21 134/82   03/12/21 135/85   12/02/20 138/70          (goal 120/80)    Past Medical History:   Diagnosis Date    Hyperlipidemia     Hypertension       Past Surgical History:   Procedure Laterality Date    FOOT SURGERY      TONSILLECTOMY AND ADENOIDECTOMY         Family History   Problem Relation Age of Onset    Heart Attack Maternal Cousin        Social History     Tobacco Use    Smoking status: Never Smoker    Smokeless tobacco: Never Used   Substance Use Topics    Alcohol use: Yes     Comment: rare       Current Outpatient Medications   Medication Sig Dispense Refill    tadalafil (CIALIS) 5 MG tablet TAKE ONE TABLET BY MOUTH DAILY 90 tablet 1    labetalol (NORMODYNE) 200 MG tablet TAKE ONE TABLET BY MOUTH TWICE A  tablet 1    tadalafil (CIALIS) 20 MG tablet Take 1 tablet by mouth daily as needed for Erectile Dysfunction 90 tablet 1    losartan (COZAAR) 50 MG tablet TAKE ONE TABLET BY MOUTH DAILY 90 tablet 1    amLODIPine (NORVASC) 10 MG tablet TAKE ONE TABLET BY MOUTH DAILY 90 tablet 1    chlorthalidone (HYGROTON) 25 MG tablet TAKE ONE TABLET BY MOUTH DAILY 90 tablet 1    simvastatin (ZOCOR) 20 MG tablet TAKE ONE TABLET BY MOUTH ONCE NIGHTLY 90 tablet 1    vitamin D (ERGOCALCIFEROL) 1.25 MG (53817 UT) CAPS capsule Take 1 capsule by mouth once a week 4 capsule 5    cloNIDine (CATAPRES) 0.1 MG tablet TAKE ONE TABLET BY MOUTH TWICE A DAY 60 tablet 2     No current facility-administered medications for this visit. No Known Allergies    Health Maintenance   Topic Date Due    Hepatitis C screen  Never done    Varicella vaccine (1 of 2 - 2-dose childhood series) Never done    COVID-19 Vaccine (1) Never done    HIV screen  Never done    DTaP/Tdap/Td vaccine (1 - Tdap) 09/02/2021 (Originally 4/30/2000)    Flu vaccine (1) 09/01/2021    Lipid screen  03/12/2022    Potassium monitoring  03/12/2022    Creatinine monitoring  03/12/2022    Hepatitis A vaccine  Aged Out    Hepatitis B vaccine  Aged Out    Hib vaccine  Aged Out    Meningococcal (ACWY) vaccine  Aged Out    Pneumococcal 0-64 years Vaccine  Aged Out       Subjective:      Review of Systems   Constitutional: Negative for chills, fatigue and fever. HENT: Negative for congestion, hearing loss and rhinorrhea. Eyes: Negative for discharge and itching. Respiratory: Negative for cough, shortness of breath and wheezing.     Cardiovascular: Negative for chest pain, palpitations and leg swelling. Gastrointestinal: Negative for abdominal distention and abdominal pain. Endocrine: Negative for polydipsia and polyphagia. Genitourinary: Negative for difficulty urinating and dysuria. Musculoskeletal: Negative for arthralgias and back pain. Skin: Negative for rash and wound. Neurological: Negative for dizziness, seizures, light-headedness and headaches. Psychiatric/Behavioral: Negative for agitation and behavioral problems. Objective:     Physical Exam  Constitutional:       Appearance: He is well-developed. HENT:      Head: Normocephalic and atraumatic. Eyes:      Conjunctiva/sclera: Conjunctivae normal.      Pupils: Pupils are equal, round, and reactive to light. Neck:      Thyroid: No thyromegaly. Vascular: No JVD. Cardiovascular:      Rate and Rhythm: Normal rate and regular rhythm. Heart sounds: Normal heart sounds. No murmur heard. Pulmonary:      Effort: Pulmonary effort is normal.      Breath sounds: Normal breath sounds. No wheezing. Abdominal:      General: Bowel sounds are normal. There is no distension. Palpations: Abdomen is soft. Tenderness: There is no abdominal tenderness. There is no rebound. Musculoskeletal:         General: No tenderness. Normal range of motion. Cervical back: Normal range of motion and neck supple. Neurological:      Mental Status: He is alert and oriented to person, place, and time. Cranial Nerves: No cranial nerve deficit. Psychiatric:         Behavior: Behavior normal.       /82   Pulse 66   Resp 16   Wt 195 lb 9.6 oz (88.7 kg)   SpO2 98%   BMI 28.07 kg/m²     Assessment:          1. Resistant hypertension  Well-controlled on Norvasc, Cozaar, chlorthalidone, clonidine and labetalol    2. Erectile dysfunction, unspecified erectile dysfunction type  - tadalafil (CIALIS) 5 MG tablet; TAKE ONE TABLET BY MOUTH DAILY  Dispense: 90 tablet;  Refill: 1  - tadalafil (CIALIS) 20 MG tablet; Take 1 tablet by mouth daily as needed for Erectile Dysfunction  Dispense: 90 tablet; Refill: 1    3. Mixed hyperlipidemia  - simvastatin (ZOCOR) 20 MG tablet; TAKE ONE TABLET BY MOUTH ONCE NIGHTLY  Dispense: 90 tablet; Refill: 1    4. Overweight (BMI 25.0-29. 9)  Advised lifestyle changes    5. Vitamin D deficiency  - vitamin D (ERGOCALCIFEROL) 1.25 MG (76029 UT) CAPS capsule; Take 1 capsule by mouth once a week  Dispense: 4 capsule; Refill: 5            Diagnosis Orders   1. Resistant hypertension     2. Erectile dysfunction, unspecified erectile dysfunction type  tadalafil (CIALIS) 5 MG tablet    tadalafil (CIALIS) 20 MG tablet   3. Mixed hyperlipidemia  simvastatin (ZOCOR) 20 MG tablet   4. Overweight (BMI 25.0-29.9)     5. Vitamin D deficiency  vitamin D (ERGOCALCIFEROL) 1.25 MG (46532 UT) CAPS capsule           Plan:      Return in about 3 months (around 11/4/2021). No orders of the defined types were placed in this encounter.     Orders Placed This Encounter   Medications    tadalafil (CIALIS) 5 MG tablet     Sig: TAKE ONE TABLET BY MOUTH DAILY     Dispense:  90 tablet     Refill:  1    labetalol (NORMODYNE) 200 MG tablet     Sig: TAKE ONE TABLET BY MOUTH TWICE A DAY     Dispense:  180 tablet     Refill:  1    tadalafil (CIALIS) 20 MG tablet     Sig: Take 1 tablet by mouth daily as needed for Erectile Dysfunction     Dispense:  90 tablet     Refill:  1    losartan (COZAAR) 50 MG tablet     Sig: TAKE ONE TABLET BY MOUTH DAILY     Dispense:  90 tablet     Refill:  1    amLODIPine (NORVASC) 10 MG tablet     Sig: TAKE ONE TABLET BY MOUTH DAILY     Dispense:  90 tablet     Refill:  1    chlorthalidone (HYGROTON) 25 MG tablet     Sig: TAKE ONE TABLET BY MOUTH DAILY     Dispense:  90 tablet     Refill:  1    simvastatin (ZOCOR) 20 MG tablet     Sig: TAKE ONE TABLET BY MOUTH ONCE NIGHTLY     Dispense:  90 tablet     Refill:  1    vitamin D (ERGOCALCIFEROL) 1.25 MG (81781 UT) CAPS capsule     Sig: Take 1 capsule by mouth once a week     Dispense:  4 capsule     Refill:  5         Patient given educational materials - see patient instructions. Discussed use, benefit, and side effects of prescribedmedications. All patient questions answered. Pt voiced understanding. Reviewed health maintenance. Instructed to continue current medications, diet and exercise. Patient agreed with treatment plan. Follow up as directed. I spent a total of 20-29 minutes face to face with this patient. Over 50% of that time was spent on counseling and care coordination. Please see assessment and plan for details. Electronically signed by   Alma Melgar MD on 8/4/2021 at 8:54 AM  [facility]. Please note that this chart was generated using voice recognition Dragon dictation software. Although every effort was made to ensure the accuracy of this automatedtranscription, some errors in transcription may have occurred.

## 2021-10-22 DIAGNOSIS — I10 ESSENTIAL HYPERTENSION: ICD-10-CM

## 2021-10-22 RX ORDER — CLONIDINE HYDROCHLORIDE 0.1 MG/1
TABLET ORAL
Qty: 180 TABLET | Refills: 1 | Status: SHIPPED | OUTPATIENT
Start: 2021-10-22 | End: 2022-04-18 | Stop reason: SDUPTHER

## 2021-11-05 RX ORDER — LOSARTAN POTASSIUM 50 MG/1
TABLET ORAL
Qty: 90 TABLET | Refills: 3 | Status: SHIPPED | OUTPATIENT
Start: 2021-11-05 | End: 2022-10-31

## 2021-11-10 ENCOUNTER — OFFICE VISIT (OUTPATIENT)
Dept: PRIMARY CARE CLINIC | Age: 41
End: 2021-11-10
Payer: COMMERCIAL

## 2021-11-10 VITALS
HEIGHT: 70 IN | HEART RATE: 86 BPM | DIASTOLIC BLOOD PRESSURE: 94 MMHG | OXYGEN SATURATION: 97 % | WEIGHT: 190.4 LBS | RESPIRATION RATE: 13 BRPM | SYSTOLIC BLOOD PRESSURE: 138 MMHG | BODY MASS INDEX: 27.26 KG/M2

## 2021-11-10 DIAGNOSIS — I10 RESISTANT HYPERTENSION: Primary | ICD-10-CM

## 2021-11-10 DIAGNOSIS — J34.9 NOSE DISORDER: ICD-10-CM

## 2021-11-10 PROCEDURE — 99213 OFFICE O/P EST LOW 20 MIN: CPT | Performed by: NURSE PRACTITIONER

## 2021-11-10 RX ORDER — FLUTICASONE PROPIONATE 50 MCG
2 SPRAY, SUSPENSION (ML) NASAL DAILY
Qty: 16 G | Refills: 3 | Status: SHIPPED | OUTPATIENT
Start: 2021-11-10

## 2021-11-10 ASSESSMENT — ENCOUNTER SYMPTOMS
NAUSEA: 0
SORE THROAT: 0
VOMITING: 0
RHINORRHEA: 0
CHEST TIGHTNESS: 0
DIARRHEA: 0
ABDOMINAL PAIN: 0
COLOR CHANGE: 0
SHORTNESS OF BREATH: 0

## 2021-11-10 NOTE — PROGRESS NOTES
[facility] PRIMARY CARE  [address]   [address]  [address] [address]  Dept: [phone]  Dept Fax: [phone]    Osiris Butler is a 37 y.o. male who presentstoday for his medical conditions/complaints as noted below. Osiris Butler is c/o of  Chief Complaint   Patient presents with    Hypertension    3 Month Follow-Up    Referral - General     ENT         HPI:     Here for 3 month f/u for HTN, previously well controlled with losartan, chlorthalidone, Norvasc, labetalol and clonidine. Is slightly elevated in office today, he does report to being stressed. Did take medications this morning. Denies chest pain, shortness of breath, headaches, palpitations. ED is improved with Cialis, denies dizziness or lightheadedness. Has complaint of difficulty breathing through nose. Saw ENT several years ago for enlarged lymph node near left ear, reports he had to have this removed due to swelling or infection. Denies seasonal allergy symptoms or nasal congestion. Has not tried anything over-the-counter as far as medication or Breathe Right strips. Compensates by breathing through mouth and this becomes uncomfortable at times due to drying out mucous membranes.   Denies sinus pain or pressure      Hemoglobin A1C (%)   Date Value   03/12/2021 5.3             ( goal A1C is < 7)   No results found for: LABMICR  LDL Cholesterol (mg/dL)   Date Value   03/12/2021 116   03/05/2020 99       (goal LDL is <100)   AST (U/L)   Date Value   03/12/2021 17     ALT (U/L)   Date Value   03/12/2021 27     BUN (mg/dL)   Date Value   03/12/2021 20     BP Readings from Last 3 Encounters:   11/10/21 (!) 138/94   08/04/21 134/82   03/12/21 135/85          ([id number])    Past Medical History:   Diagnosis Date    Hyperlipidemia     Hypertension       Past Surgical History:   Procedure Laterality Date    FOOT SURGERY      TONSILLECTOMY AND ADENOIDECTOMY         Family History Problem Relation Age of Onset    Heart Attack Maternal Cousin        Social History     Tobacco Use    Smoking status: Never Smoker    Smokeless tobacco: Never Used   Substance Use Topics    Alcohol use: Yes     Comment: rare       Current Outpatient Medications   Medication Sig Dispense Refill    fluticasone (FLONASE) 50 MCG/ACT nasal spray 2 sprays by Each Nostril route daily 16 g 3    losartan (COZAAR) 50 MG tablet TAKE ONE TABLET BY MOUTH DAILY 90 tablet 3    cloNIDine (CATAPRES) 0.1 MG tablet TAKE ONE TABLET BY MOUTH TWICE A  tablet 1    tadalafil (CIALIS) 5 MG tablet TAKE ONE TABLET BY MOUTH DAILY 90 tablet 1    labetalol (NORMODYNE) 200 MG tablet TAKE ONE TABLET BY MOUTH TWICE A  tablet 1    tadalafil (CIALIS) 20 MG tablet Take 1 tablet by mouth daily as needed for Erectile Dysfunction 90 tablet 1    amLODIPine (NORVASC) 10 MG tablet TAKE ONE TABLET BY MOUTH DAILY 90 tablet 1    chlorthalidone (HYGROTON) 25 MG tablet TAKE ONE TABLET BY MOUTH DAILY 90 tablet 1    simvastatin (ZOCOR) 20 MG tablet TAKE ONE TABLET BY MOUTH ONCE NIGHTLY 90 tablet 1    vitamin D (ERGOCALCIFEROL) 1.25 MG (87449 UT) CAPS capsule Take 1 capsule by mouth once a week 4 capsule 5     No current facility-administered medications for this visit.      No Known Allergies    Health Maintenance   Topic Date Due    Hepatitis C screen  Never done    Varicella vaccine (1 of 2 - 2-dose childhood series) Never done    COVID-19 Vaccine (1) Never done    HIV screen  Never done    DTaP/Tdap/Td vaccine (1 - Tdap) Never done    Flu vaccine (1) 06/30/2022 (Originally 9/1/2021)    Lipid screen  03/12/2022    Potassium monitoring  03/12/2022    Creatinine monitoring  03/12/2022    Hepatitis A vaccine  Aged Out    Hepatitis B vaccine  Aged Out    Hib vaccine  Aged Out    Meningococcal (ACWY) vaccine  Aged Out    Pneumococcal 0-64 years Vaccine  Aged Out       Subjective:      Review of Systems   Constitutional: Negative for activity change, fatigue and fever. HENT: Negative for congestion, rhinorrhea and sore throat. Difficulty breathing through nose   Eyes: Negative for visual disturbance. Respiratory: Negative for chest tightness and shortness of breath. Cardiovascular: Negative for chest pain and palpitations. Gastrointestinal: Negative for abdominal pain, diarrhea, nausea and vomiting. Endocrine: Negative for polydipsia. Genitourinary: Negative for difficulty urinating. Musculoskeletal: Negative for arthralgias and myalgias. Skin: Negative for color change. Neurological: Negative for weakness and headaches. Psychiatric/Behavioral: Negative for behavioral problems. The patient is not nervous/anxious. All other systems reviewed and are negative. Objective:   BP (!) 138/94   Pulse 86   Resp 13   Ht 5' 10\" (1.778 m)   Wt 190 lb 6.4 oz (86.4 kg)   SpO2 97%   BMI 27.32 kg/m²   Physical Exam  Vitals reviewed. Constitutional:       General: He is not in acute distress. Appearance: Normal appearance. HENT:      Head: Normocephalic. Nose: Mucosal edema present. Right Turbinates: Enlarged. Left Turbinates: Enlarged. Right Sinus: No maxillary sinus tenderness or frontal sinus tenderness. Left Sinus: No maxillary sinus tenderness or frontal sinus tenderness. Comments: Narrow nasal passages, bilaterally     Mouth/Throat:      Pharynx: Oropharynx is clear. Eyes:      Pupils: Pupils are equal, round, and reactive to light. Cardiovascular:      Rate and Rhythm: Normal rate and regular rhythm. Pulses: Normal pulses. Heart sounds: Normal heart sounds. Pulmonary:      Effort: Pulmonary effort is normal.      Breath sounds: Normal breath sounds. Abdominal:      General: There is no distension. Musculoskeletal:         General: Normal range of motion. Right lower leg: No edema. Left lower leg: No edema.    Skin:     General: Skin is warm and dry. Capillary Refill: Capillary refill takes less than 2 seconds. Neurological:      General: No focal deficit present. Mental Status: He is alert and oriented to person, place, and time. Psychiatric:         Mood and Affect: Mood normal.         Behavior: Behavior normal.           :       Diagnosis Orders   1. Resistant hypertension     2. Nose disorder  fluticasone (FLONASE) 50 MCG/ACT nasal spray             :          1. Resistant hypertension  Previously well controlled and only slightly elevated today, continue current medications. Follow-up in 3 months    2. Nose disorder  Trial Flonase daily, if no improvement recommend ENT. He is agreeable to this, would like to wait to see ENT until after the winter months.  - fluticasone (FLONASE) 50 MCG/ACT nasal spray; 2 sprays by Each Nostril route daily  Dispense: 16 g; Refill: 3    Return in 3 months (on 2/10/2022), or if symptoms worsen or fail to improve, for Hypertension. Patient given educational materials - see patient instructions. Discussed use, benefit, and side effects of prescribed medications. All patient questions answered. Pt voiced understanding. Reviewed health maintenance. Instructed to continue current medications, diet and exercise. Patient agreed with treatment plan. Follow up as directed.        Electronicallysigned by Baldo Gitelman, APRN - CNP on 11/10/2021 at 9:21 AM

## 2021-11-10 NOTE — PATIENT INSTRUCTIONS
Patient Education        fluticasone nasal  Pronunciation:  floo TIK a sone  Brand:  Flonase, Veramyst, Jojo Rushing  What is the most important information I should know about fluticasone nasal?  Follow all directions on your medicine label and package. Tell each of your healthcare providers about all your medical conditions, allergies, and all medicines you use. What is fluticasone nasal?  Fluticasone nasal (for the nose) is a steroid medicine that is used to treat nasal congestion, sneezing, runny nose, and itchy or watery eyes caused by seasonal or year-round allergies. The Jojo Rushing brand of this medicine is for use only in adults. Veramyst may be used in children as young as 3years old. Flonase is for use in adults and children who are at least 3years old. Fluticasone nasal may also be used for purposes not listed in this medication guide. What should I discuss with my healthcare provider before using fluticasone nasal?  You should not use fluticasone nasal if you are allergic to it. Fluticasone can weaken your immune system,  making it easier for you to get an infection or worsening an infection you already have or recently had. Tell your doctor about any illness or infection you have had within the past several weeks. Tell your doctor if you have ever had:  · sores or ulcers inside your nose;  · injury of or surgery on your nose;  · glaucoma or cataracts;  · liver disease;  · diabetes;  · a weak immune system; or  · any type of infection (bacterial, fungal, viral, or parasitic). If you use fluticasone nasal without a prescription and you have any medical conditions, ask a doctor or pharmacist if this medicine is safe for you. Tell your doctor if you are pregnant or breast-feeding. How should I use fluticasone nasal?  Follow all directions on your prescription label and read all medication guides or instruction sheets. Use the medicine exactly as directed.   Do not share this medicine with another person, even if they have the same symptoms you have. Your dose will depend on the fluticasone brand or strength you use, and your dose may change once your symptoms improve. Follow all dosing instructions very carefully. A child using the nasal spray should be supervised by an adult. Read and carefully follow any Instructions for Use provided with your medicine. Ask your doctor or pharmacist if you do not understand these instructions. Shake the nasal spray just before each use. If you switched to fluticasone from another steroid medicine, you should not stop using it suddenly. Follow your doctor's instructions about tapering your dose. It may take several days before your symptoms improve. Keep using the medication as directed and tell your doctor if your symptoms do not improve after a week of treatment. Store fluticasone nasal in an upright position at room temperature, away from moisture and heat. Throw the spray bottle away after you have used 120 sprays, even if there is still medicine left in the bottle. What happens if I miss a dose? Use the medicine as soon as you can, but skip the missed dose if it is almost time for your next dose. Do not use two doses at one time. What happens if I overdose? Seek emergency medical attention or call the Poison Help line at 1-992.169.3460. An overdose of fluticasone nasal is not expected to produce life threatening symptoms. Long term use of steroid medicine can lead to glaucoma, cataracts, thinning skin, easy bruising, changes in body fat (especially in your face, neck, back, and waist), increased acne or facial hair, menstrual problems, impotence, or loss of interest in sex. What should I avoid while using fluticasone nasal?  Avoid getting the spray in your eyes or mouth. If this does happen, rinse with water. Avoid being near people who are sick or have infections. Call your doctor for preventive treatment if you are exposed to chickenpox or measles.  These conditions can be serious or even fatal in people who are using fluticasone nasal.  What are the possible side effects of fluticasone nasal?  Get emergency medical help if you have signs of an allergic reaction: hives, rash; feeling light-headed; difficult breathing; swelling of your face, lips, tongue, or throat. Call your doctor at once if you have:  · severe or ongoing nosebleeds;  · noisy breathing, runny nose, or crusting around your nostrils;  · redness, sores, or white patches in your mouth or throat;  · fever, chills, body aches;  · blurred vision, eye pain, or seeing halos around lights;  · any wound that will not heal; or  · signs of a hormonal disorder --worsening tiredness or muscle weakness, feeling light-headed, nausea, vomiting. Steroid medicine can affect growth in children. Tell your doctor if your child is not growing at a normal rate while using this medicine. Common side effects may include:  · minor nosebleed, burning or itching in your nose;  · sores or white patches inside or around your nose;  · cough, trouble breathing;  · headache, back pain;  · sinus pain, sore throat, fever; or  · nausea, vomiting. This is not a complete list of side effects and others may occur. Call your doctor for medical advice about side effects. You may report side effects to FDA at 0-986-FDA-9093. What other drugs will affect fluticasone nasal?  Tell your doctor about all your other medicines, especially:  · antifungal medicine; or  · antiviral medicine to treat hepatis C or HIV/AIDS. This list is not complete. Other drugs may affect fluticasone nasal, including prescription and over-the-counter medicines, vitamins, and herbal products. Not all possible drug interactions are listed here. Where can I get more information?   Your pharmacist can provide more information about fluticasone nasal.  Remember, keep this and all other medicines out of the reach of children, never share your medicines with others, and use this medication only for the indication prescribed. Every effort has been made to ensure that the information provided by [address] is accurate, up-to-date, and complete, but no guarantee is made to that effect. Drug information contained herein may be time sensitive. [facility] information has been compiled for use by healthcare practitioners and consumers in the [place] and therefore [facility] does not warrant that uses outside of the [place] are appropriate, unless specifically indicated otherwise. [facility]'s drug information does not endorse drugs, diagnose patients or recommend therapy. [facility]'s drug information is an informational resource designed to assist licensed healthcare practitioners in caring for their patients and/or to serve consumers viewing this service as a supplement to, and not a substitute for, the expertise, skill, knowledge and judgment of healthcare practitioners. The absence of a warning for a given drug or drug combination in no way should be construed to indicate that the drug or drug combination is safe, effective or appropriate for any given patient. [facility] does not assume any responsibility for any aspect of healthcare administered with the aid of information [facility] provides. The information contained herein is not intended to cover all possible uses, directions, precautions, warnings, drug interactions, allergic reactions, or adverse effects. If you have questions about the drugs you are taking, check with your doctor, nurse or pharmacist.  Copyright 8524-6554 [address]: 10.02. Revision date: 12/30/2019. Care instructions adapted under license by [facility] ([facility]). If you have questions about a medical condition or this instruction, always ask your healthcare professional. [place] [address] any warranty or liability for your use of this information.

## 2022-02-25 DIAGNOSIS — E78.2 MIXED HYPERLIPIDEMIA: ICD-10-CM

## 2022-02-25 RX ORDER — SIMVASTATIN 20 MG
TABLET ORAL
Qty: 90 TABLET | Refills: 1 | Status: SHIPPED | OUTPATIENT
Start: 2022-02-25 | End: 2022-08-25 | Stop reason: SDUPTHER

## 2022-04-04 RX ORDER — CHLORTHALIDONE 25 MG/1
TABLET ORAL
Qty: 90 TABLET | Refills: 1 | Status: SHIPPED | OUTPATIENT
Start: 2022-04-04 | End: 2022-10-07 | Stop reason: SDUPTHER

## 2022-04-04 RX ORDER — AMLODIPINE BESYLATE 10 MG/1
TABLET ORAL
Qty: 90 TABLET | Refills: 1 | Status: SHIPPED | OUTPATIENT
Start: 2022-04-04 | End: 2022-10-10 | Stop reason: SDUPTHER

## 2022-04-17 DIAGNOSIS — I10 ESSENTIAL HYPERTENSION: ICD-10-CM

## 2022-04-18 RX ORDER — CLONIDINE HYDROCHLORIDE 0.1 MG/1
TABLET ORAL
Qty: 180 TABLET | Refills: 1 | OUTPATIENT
Start: 2022-04-18

## 2022-04-20 RX ORDER — CLONIDINE HYDROCHLORIDE 0.1 MG/1
TABLET ORAL
Qty: 60 TABLET | Refills: 0 | Status: SHIPPED | OUTPATIENT
Start: 2022-04-20 | End: 2022-05-16

## 2022-05-03 ENCOUNTER — OFFICE VISIT (OUTPATIENT)
Dept: PRIMARY CARE CLINIC | Age: 42
End: 2022-05-03
Payer: COMMERCIAL

## 2022-05-03 ENCOUNTER — HOSPITAL ENCOUNTER (OUTPATIENT)
Age: 42
Setting detail: SPECIMEN
Discharge: HOME | End: 2022-05-03

## 2022-05-03 VITALS
OXYGEN SATURATION: 97 % | WEIGHT: 194 LBS | HEART RATE: 74 BPM | DIASTOLIC BLOOD PRESSURE: 80 MMHG | SYSTOLIC BLOOD PRESSURE: 122 MMHG | BODY MASS INDEX: 27.84 KG/M2

## 2022-05-03 DIAGNOSIS — M25.522 ELBOW PAIN, CHRONIC, LEFT: ICD-10-CM

## 2022-05-03 DIAGNOSIS — M25.512 CHRONIC LEFT SHOULDER PAIN: ICD-10-CM

## 2022-05-03 DIAGNOSIS — E78.2 MIXED HYPERLIPIDEMIA: ICD-10-CM

## 2022-05-03 DIAGNOSIS — I10 RESISTANT HYPERTENSION: Primary | ICD-10-CM

## 2022-05-03 DIAGNOSIS — G89.29 CHRONIC LEFT SHOULDER PAIN: ICD-10-CM

## 2022-05-03 DIAGNOSIS — E55.9 VITAMIN D DEFICIENCY: ICD-10-CM

## 2022-05-03 DIAGNOSIS — N52.8 OTHER MALE ERECTILE DYSFUNCTION: ICD-10-CM

## 2022-05-03 DIAGNOSIS — G89.29 ELBOW PAIN, CHRONIC, LEFT: ICD-10-CM

## 2022-05-03 DIAGNOSIS — R53.83 OTHER FATIGUE: ICD-10-CM

## 2022-05-03 DIAGNOSIS — E66.3 OVERWEIGHT (BMI 25.0-29.9): ICD-10-CM

## 2022-05-03 LAB
ABSOLUTE EOS #: 0.43 K/UL (ref 0–0.44)
ABSOLUTE IMMATURE GRANULOCYTE: <0.03 K/UL (ref 0–0.3)
ABSOLUTE LYMPH #: 1.37 K/UL (ref 1.1–3.7)
ABSOLUTE MONO #: 0.45 K/UL (ref 0.1–1.2)
ALBUMIN SERPL-MCNC: 4.8 G/DL (ref 3.5–5.2)
ALBUMIN/GLOBULIN RATIO: 3 (ref 1–2.5)
ALP BLD-CCNC: 64 U/L (ref 40–129)
ALT SERPL-CCNC: 27 U/L (ref 5–41)
ANION GAP SERPL CALCULATED.3IONS-SCNC: 11 MMOL/L (ref 9–17)
AST SERPL-CCNC: 24 U/L
BASOPHILS # BLD: 2 % (ref 0–2)
BASOPHILS ABSOLUTE: 0.07 K/UL (ref 0–0.2)
BILIRUB SERPL-MCNC: 0.44 MG/DL (ref 0.3–1.2)
BUN BLDV-MCNC: 19 MG/DL (ref 6–20)
CALCIUM SERPL-MCNC: 9 MG/DL (ref 8.6–10.4)
CHLORIDE BLD-SCNC: 98 MMOL/L (ref 98–107)
CHOLESTEROL/HDL RATIO: 3
CHOLESTEROL: 164 MG/DL
CO2: 26 MMOL/L (ref 20–31)
CREAT SERPL-MCNC: 0.86 MG/DL (ref 0.7–1.2)
EOSINOPHILS RELATIVE PERCENT: 9 % (ref 1–4)
FOLATE: 9.1 NG/ML
GFR AFRICAN AMERICAN: >60 ML/MIN
GFR NON-AFRICAN AMERICAN: >60 ML/MIN
GFR SERPL CREATININE-BSD FRML MDRD: ABNORMAL ML/MIN/{1.73_M2}
GLUCOSE BLD-MCNC: 105 MG/DL (ref 70–99)
HCT VFR BLD CALC: 45.1 % (ref 40.7–50.3)
HDLC SERPL-MCNC: 55 MG/DL
HEMOGLOBIN: 15.5 G/DL (ref 13–17)
IMMATURE GRANULOCYTES: 0 %
LDL CHOLESTEROL: 95 MG/DL (ref 0–130)
LYMPHOCYTES # BLD: 30 % (ref 24–43)
MCH RBC QN AUTO: 32.8 PG (ref 25.2–33.5)
MCHC RBC AUTO-ENTMCNC: 34.4 G/DL (ref 28.4–34.8)
MCV RBC AUTO: 95.6 FL (ref 82.6–102.9)
MONOCYTES # BLD: 10 % (ref 3–12)
NRBC AUTOMATED: 0 PER 100 WBC
PDW BLD-RTO: 11.9 % (ref 11.8–14.4)
PLATELET # BLD: 196 K/UL (ref 138–453)
PMV BLD AUTO: 10.4 FL (ref 8.1–13.5)
POTASSIUM SERPL-SCNC: 4 MMOL/L (ref 3.7–5.3)
RBC # BLD: 4.72 M/UL (ref 4.21–5.77)
SEG NEUTROPHILS: 49 % (ref 36–65)
SEGMENTED NEUTROPHILS ABSOLUTE COUNT: 2.25 K/UL (ref 1.5–8.1)
SODIUM BLD-SCNC: 135 MMOL/L (ref 135–144)
TOTAL PROTEIN: 6.4 G/DL (ref 6.4–8.3)
TRIGL SERPL-MCNC: 69 MG/DL
TSH SERPL DL<=0.05 MIU/L-ACNC: 0.87 UIU/ML (ref 0.3–5)
VITAMIN B-12: 570 PG/ML (ref 232–1245)
VITAMIN D 25-HYDROXY: 23.1 NG/ML
WBC # BLD: 4.6 K/UL (ref 3.5–11.3)

## 2022-05-03 PROCEDURE — 99214 OFFICE O/P EST MOD 30 MIN: CPT | Performed by: STUDENT IN AN ORGANIZED HEALTH CARE EDUCATION/TRAINING PROGRAM

## 2022-05-03 RX ORDER — PREDNISONE 10 MG/1
10 TABLET ORAL DAILY
Qty: 10 TABLET | Refills: 0 | Status: SHIPPED | OUTPATIENT
Start: 2022-05-03 | End: 2022-05-13

## 2022-05-03 RX ORDER — TIZANIDINE 2 MG/1
2 TABLET ORAL NIGHTLY PRN
Qty: 10 TABLET | Refills: 0 | Status: SHIPPED | OUTPATIENT
Start: 2022-05-03

## 2022-05-03 ASSESSMENT — ENCOUNTER SYMPTOMS
EYE DISCHARGE: 0
RHINORRHEA: 0
COUGH: 0
WHEEZING: 0
ABDOMINAL DISTENTION: 0
ABDOMINAL PAIN: 0
EYE ITCHING: 0
BACK PAIN: 1

## 2022-05-03 NOTE — PROGRESS NOTES
[facility] PRIMARY [facility]ARE  [address]   [address]  [address] [address]  Dept: [phone]  Dept Fax: [phone]    Baron Gordillo is a 40 y.o. male who presents today for his medical conditions/complaints as noted below. Chief Complaint   Patient presents with    Hypertension     f/u    Shoulder Pain     left    Elbow Pain     left       HPI:     37 y.o. male who came to the office as a follow-up visit annual labs and chronic upper back pain and left shoulder pain. Patient mentioned that he has chronic pain in his upper back which she thinks is related to knot in his back. Also mentioned having chronic left shoulder pain and left arm pain. 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 Denied any recent trauma, associated numbness tingling or weakness. He mentioned he has been using marijuana for his symptoms. Tried chiropractor in the past which did not help him. Refused physical therapy. Other chronic comorbidities including hypertension and hyperlipidemia have been stable. He is on tadalafil for erectile dysfunction and BPH  No other complaints. Medications reviewed and refilled as appropriate, problem list updated. All concerns discussed in details and all questions answered to patient satisfaction.             Hemoglobin A1C (%)   Date Value   03/12/2021 5.3             ( goal A1C is < 7)   No results found for: LABMICR  LDL Cholesterol (mg/dL)   Date Value   03/12/2021 116   03/05/2020 99       (goal LDL is <100)   AST (U/L)   Date Value   03/12/2021 17     ALT (U/L)   Date Value   03/12/2021 27     BUN (mg/dL)   Date Value   03/12/2021 20     BP Readings from Last 3 Encounters:   05/03/22 122/80   11/10/21 (!) 138/94   08/04/21 134/82          (goal 120/80)    Past Medical History:   Diagnosis Date    Hyperlipidemia     Hypertension       Past Surgical History:   Procedure Laterality Date    FOOT SURGERY      TONSILLECTOMY AND ADENOIDECTOMY         Family History   Problem Relation Age of Onset    Heart Attack Maternal Cousin        Social History     Tobacco Use    Smoking status: Never Smoker    Smokeless tobacco: Never Used   Substance Use Topics    Alcohol use: Yes     Comment: rare       Current Outpatient Medications   Medication Sig Dispense Refill    predniSONE (DELTASONE) 10 MG tablet Take 1 tablet by mouth daily for 10 days 10 tablet 0    tiZANidine (ZANAFLEX) 2 MG tablet Take 1 tablet by mouth nightly as needed (back spasm) 10 tablet 0    cloNIDine (CATAPRES) 0.1 MG tablet TAKE ONE TABLET BY MOUTH TWICE A DAY 60 tablet 0    amLODIPine (NORVASC) 10 MG tablet TAKE ONE TABLET BY MOUTH DAILY 90 tablet 1    chlorthalidone (HYGROTON) 25 MG tablet TAKE ONE TABLET BY MOUTH DAILY 90 tablet 1    simvastatin (ZOCOR) 20 MG tablet TAKE ONE TABLET BY MOUTH ONCE NIGHTLY 90 tablet 1    fluticasone (FLONASE) 50 MCG/ACT nasal spray 2 sprays by Each Nostril route daily 16 g 3    losartan (COZAAR) 50 MG tablet TAKE ONE TABLET BY MOUTH DAILY 90 tablet 3    tadalafil (CIALIS) 5 MG tablet TAKE ONE TABLET BY MOUTH DAILY 90 tablet 1    labetalol (NORMODYNE) 200 MG tablet TAKE ONE TABLET BY MOUTH TWICE A  tablet 1    tadalafil (CIALIS) 20 MG tablet Take 1 tablet by mouth daily as needed for Erectile Dysfunction 90 tablet 1    vitamin D (ERGOCALCIFEROL) 1.25 MG (67967 UT) CAPS capsule Take 1 capsule by mouth once a week 4 capsule 5     No current facility-administered medications for this visit.      No Known Allergies    Health Maintenance   Topic Date Due    Varicella vaccine (1 of 2 - 2-dose childhood series) Never done    COVID-19 Vaccine (1) Never done    HIV screen  Never done    Hepatitis C screen  Never done    DTaP/Tdap/Td vaccine (1 - Tdap) Never done    Lipids  03/12/2022    Potassium  03/12/2022    Creatinine  03/12/2022    Depression Screen  08/04/2022    Flu vaccine (Season Ended) 09/01/2022    Hepatitis A vaccine  Aged Out    Hepatitis B vaccine  Aged Out    Hib vaccine  Aged Out    Meningococcal (ACWY) vaccine  Aged Out    Pneumococcal 0-64 years Vaccine  Aged Out       Subjective:      Review of Systems   Constitutional: Positive for fatigue. Negative for chills. HENT: Negative for congestion, hearing loss and rhinorrhea. Eyes: Negative for discharge and itching. Respiratory: Negative for cough and wheezing. Cardiovascular: Negative for leg swelling. Gastrointestinal: Negative for abdominal distention and abdominal pain. Endocrine: Negative for polydipsia and polyphagia. Genitourinary: Negative for difficulty urinating and dysuria. Musculoskeletal: Positive for arthralgias and back pain. Left shoulder pain. Left arm pain. Skin: Negative for rash and wound. Neurological: Negative for dizziness, seizures and light-headedness. Psychiatric/Behavioral: Negative for agitation and behavioral problems. Objective:     Physical Exam  Constitutional:       Appearance: He is well-developed. HENT:      Head: Normocephalic and atraumatic. Eyes:      Conjunctiva/sclera: Conjunctivae normal.      Pupils: Pupils are equal, round, and reactive to light. Neck:      Thyroid: No thyromegaly. Vascular: No JVD. Cardiovascular:      Rate and Rhythm: Normal rate and regular rhythm. Heart sounds: Normal heart sounds. No murmur heard. Pulmonary:      Effort: Pulmonary effort is normal.      Breath sounds: Normal breath sounds. No wheezing. Abdominal:      General: Bowel sounds are normal. There is no distension. Palpations: Abdomen is soft. Tenderness: There is no abdominal tenderness. There is no rebound. Musculoskeletal:         General: Tenderness present. Normal range of motion. Cervical back: Normal range of motion and neck supple.       Comments: Tenderness present over left shoulder and upper back   Neurological:      Mental Status: He is alert and oriented to person, place, and time. Cranial Nerves: No cranial nerve deficit. Psychiatric:         Behavior: Behavior normal.       /80   Pulse 74   Wt 194 lb (88 kg)   SpO2 97%   BMI 27.84 kg/m²     Assessment:        Diagnoses and all orders for this visit:  Diagnoses and all orders for this visit:  Resistant hypertension  Other fatigue  -     CBC with Auto Differential; Future  -     Comprehensive Metabolic Panel; Future  -     Vitamin B12 & Folate; Future  -     TSH with Reflex; Future  Mixed hyperlipidemia  -     Lipid Panel; Future  Vitamin D deficiency  -     Vitamin D 25 Hydroxy; Future  Other male erectile dysfunction  Overweight (BMI 25.0-29. 9)  Chronic left shoulder pain  -     predniSONE (DELTASONE) 10 MG tablet; Take 1 tablet by mouth daily for 10 days  -     tiZANidine (ZANAFLEX) 2 MG tablet; Take 1 tablet by mouth nightly as needed (back spasm)  Elbow pain, chronic, left  -     predniSONE (DELTASONE) 10 MG tablet; Take 1 tablet by mouth daily for 10 days  -     tiZANidine (ZANAFLEX) 2 MG tablet; Take 1 tablet by mouth nightly as needed (back spasm)               Plan:      Return in about 6 months (around 11/3/2022), or if symptoms worsen or fail to improve.     Orders Placed This Encounter   Procedures    CBC with Auto Differential     Standing Status:   Future     Standing Expiration Date:   5/3/2023    Comprehensive Metabolic Panel     Standing Status:   Future     Standing Expiration Date:   11/3/2023    Lipid Panel     Standing Status:   Future     Standing Expiration Date:   5/3/2023     Order Specific Question:   Is Patient Fasting?/# of Hours     Answer:   8-10 hrs    Vitamin D 25 Hydroxy     Standing Status:   Future     Standing Expiration Date:   11/3/2023    Vitamin B12 & Folate     Standing Status:   Future     Standing Expiration Date:   5/3/2023    TSH with Reflex     Standing Status:   Future     Standing Expiration Date:   5/3/2023     Orders Placed This Encounter   Medications    predniSONE (DELTASONE) 10 MG tablet     Sig: Take 1 tablet by mouth daily for 10 days     Dispense:  10 tablet     Refill:  0    tiZANidine (ZANAFLEX) 2 MG tablet     Sig: Take 1 tablet by mouth nightly as needed (back spasm)     Dispense:  10 tablet     Refill:  0         Patient given educational materials - see patient instructions. Discussed use, benefit, and side effects of prescribedmedications. All patient questions answered. Pt voiced understanding. Reviewed health maintenance. Instructed to continue current medications, diet and exercise. Patient agreed with treatment plan. Follow up as directed. I spent a total of 30-39 minutes face to face with this patient. Over 50% of that time was spent on counseling and care coordination. Please see assessment and plan for details. Electronically signed by   Domitila Patel MD on 5/3/2022 at 8:12 AM  [facility]. Please note that this chart was generated using voice recognition Dragon dictation software. Although every effort was made to ensure the accuracy of this automatedtranscription, some errors in transcription may have occurred.

## 2022-05-04 DIAGNOSIS — E55.9 VITAMIN D DEFICIENCY: ICD-10-CM

## 2022-05-04 RX ORDER — ERGOCALCIFEROL 1.25 MG/1
50000 CAPSULE ORAL WEEKLY
Qty: 4 CAPSULE | Refills: 5 | Status: SHIPPED | OUTPATIENT
Start: 2022-05-04

## 2022-05-04 NOTE — RESULT ENCOUNTER NOTE
Please notify patient that their lab results are normal except low vitamin D levels. .   Patient can continue taking vitamin D supplements.

## 2022-05-04 NOTE — TELEPHONE ENCOUNTER
Patient was notified of his lab results, Patient states he is not taking Vitamin D. However, he did take 1 month's worth in August but did not know there were refills. He will need a refill sent to his pharmacy.      Last Visit Date: 5/3/2022   Next Visit Date: 11/4/2022

## 2022-05-16 DIAGNOSIS — I10 ESSENTIAL HYPERTENSION: ICD-10-CM

## 2022-05-16 RX ORDER — CLONIDINE HYDROCHLORIDE 0.1 MG/1
TABLET ORAL
Qty: 90 TABLET | Refills: 3 | Status: SHIPPED | OUTPATIENT
Start: 2022-05-16 | End: 2022-05-17 | Stop reason: SDUPTHER

## 2022-05-17 ENCOUNTER — TELEPHONE (OUTPATIENT)
Dept: PRIMARY CARE CLINIC | Age: 42
End: 2022-05-17

## 2022-05-17 DIAGNOSIS — I10 ESSENTIAL HYPERTENSION: ICD-10-CM

## 2022-05-17 RX ORDER — CLONIDINE HYDROCHLORIDE 0.1 MG/1
TABLET ORAL
Qty: 180 TABLET | Refills: 3 | Status: SHIPPED | OUTPATIENT
Start: 2022-05-17

## 2022-05-17 NOTE — TELEPHONE ENCOUNTER
Pharmacy called requesting amount of Clonodine 0.1 be changed to 90 days. #180. Please advise or escribe with new amount if agrees.

## 2022-06-06 RX ORDER — LABETALOL 200 MG/1
TABLET, FILM COATED ORAL
Qty: 180 TABLET | Refills: 1 | Status: SHIPPED | OUTPATIENT
Start: 2022-06-06

## 2022-07-04 DIAGNOSIS — N52.9 ERECTILE DYSFUNCTION, UNSPECIFIED ERECTILE DYSFUNCTION TYPE: ICD-10-CM

## 2022-07-05 RX ORDER — TADALAFIL 5 MG/1
TABLET ORAL
Qty: 90 TABLET | Refills: 1 | Status: SHIPPED | OUTPATIENT
Start: 2022-07-05

## 2022-08-22 ENCOUNTER — TELEPHONE (OUTPATIENT)
Dept: PRIMARY CARE CLINIC | Age: 42
End: 2022-08-22

## 2022-08-25 DIAGNOSIS — E78.2 MIXED HYPERLIPIDEMIA: ICD-10-CM

## 2022-08-25 RX ORDER — SIMVASTATIN 20 MG
TABLET ORAL
Qty: 90 TABLET | Refills: 3 | Status: SHIPPED | OUTPATIENT
Start: 2022-08-25

## 2022-10-07 RX ORDER — CHLORTHALIDONE 25 MG/1
TABLET ORAL
Qty: 90 TABLET | Refills: 3 | Status: SHIPPED | OUTPATIENT
Start: 2022-10-07

## 2022-10-10 RX ORDER — AMLODIPINE BESYLATE 10 MG/1
TABLET ORAL
Qty: 90 TABLET | Refills: 3 | Status: SHIPPED | OUTPATIENT
Start: 2022-10-10

## 2022-10-12 ENCOUNTER — TELEPHONE (OUTPATIENT)
Dept: PRIMARY CARE CLINIC | Age: 42
End: 2022-10-12

## 2022-10-12 NOTE — TELEPHONE ENCOUNTER
Pt called into office to check on the status of two medications that were refilled recently. Pt states he only received a temp supply of his amlodipine 10mg & chlorthalidone 25mg. Writer advised pt the medications had been approved and sent in on 10/7 & 10/10. Writer called and lvm for pharmacy with pertinent prescription information to fill for patient.

## 2022-10-31 RX ORDER — LOSARTAN POTASSIUM 50 MG/1
TABLET ORAL
Qty: 90 TABLET | Refills: 3 | Status: SHIPPED | OUTPATIENT
Start: 2022-10-31

## 2022-12-07 RX ORDER — LABETALOL 200 MG/1
TABLET, FILM COATED ORAL
Qty: 180 TABLET | Refills: 0 | Status: SHIPPED | OUTPATIENT
Start: 2022-12-07

## 2022-12-10 DIAGNOSIS — E55.9 VITAMIN D DEFICIENCY: ICD-10-CM

## 2022-12-12 RX ORDER — ERGOCALCIFEROL 1.25 MG/1
CAPSULE ORAL
Qty: 4 CAPSULE | Refills: 5 | Status: SHIPPED | OUTPATIENT
Start: 2022-12-12

## 2023-01-10 ENCOUNTER — OFFICE VISIT (OUTPATIENT)
Dept: PRIMARY CARE CLINIC | Age: 43
End: 2023-01-10
Payer: COMMERCIAL

## 2023-01-10 VITALS
HEART RATE: 82 BPM | SYSTOLIC BLOOD PRESSURE: 124 MMHG | OXYGEN SATURATION: 96 % | BODY MASS INDEX: 28.12 KG/M2 | DIASTOLIC BLOOD PRESSURE: 82 MMHG | WEIGHT: 196 LBS

## 2023-01-10 DIAGNOSIS — I10 RESISTANT HYPERTENSION: Primary | ICD-10-CM

## 2023-01-10 DIAGNOSIS — R35.1 BENIGN PROSTATIC HYPERPLASIA WITH NOCTURIA: ICD-10-CM

## 2023-01-10 DIAGNOSIS — N40.1 BENIGN PROSTATIC HYPERPLASIA WITH NOCTURIA: ICD-10-CM

## 2023-01-10 DIAGNOSIS — E78.2 MIXED HYPERLIPIDEMIA: ICD-10-CM

## 2023-01-10 DIAGNOSIS — N52.9 ERECTILE DYSFUNCTION, UNSPECIFIED ERECTILE DYSFUNCTION TYPE: ICD-10-CM

## 2023-01-10 PROCEDURE — 3074F SYST BP LT 130 MM HG: CPT | Performed by: PHYSICIAN ASSISTANT

## 2023-01-10 PROCEDURE — 99214 OFFICE O/P EST MOD 30 MIN: CPT | Performed by: PHYSICIAN ASSISTANT

## 2023-01-10 PROCEDURE — 3079F DIAST BP 80-89 MM HG: CPT | Performed by: PHYSICIAN ASSISTANT

## 2023-01-10 RX ORDER — CHLORTHALIDONE 25 MG/1
12.5 TABLET ORAL DAILY
Qty: 90 TABLET | Refills: 3
Start: 2023-01-10

## 2023-01-10 SDOH — ECONOMIC STABILITY: FOOD INSECURITY: WITHIN THE PAST 12 MONTHS, THE FOOD YOU BOUGHT JUST DIDN'T LAST AND YOU DIDN'T HAVE MONEY TO GET MORE.: NEVER TRUE

## 2023-01-10 SDOH — ECONOMIC STABILITY: FOOD INSECURITY: WITHIN THE PAST 12 MONTHS, YOU WORRIED THAT YOUR FOOD WOULD RUN OUT BEFORE YOU GOT MONEY TO BUY MORE.: NEVER TRUE

## 2023-01-10 ASSESSMENT — ENCOUNTER SYMPTOMS
SINUS PAIN: 0
DIARRHEA: 0
COUGH: 0
SHORTNESS OF BREATH: 0
BACK PAIN: 0
NAUSEA: 0
EYE PAIN: 0
CONSTIPATION: 0
VOMITING: 0
ABDOMINAL PAIN: 0
RHINORRHEA: 0

## 2023-01-10 ASSESSMENT — PATIENT HEALTH QUESTIONNAIRE - PHQ9
SUM OF ALL RESPONSES TO PHQ9 QUESTIONS 1 & 2: 0
2. FEELING DOWN, DEPRESSED OR HOPELESS: 0
1. LITTLE INTEREST OR PLEASURE IN DOING THINGS: 0
SUM OF ALL RESPONSES TO PHQ QUESTIONS 1-9: 0

## 2023-01-10 ASSESSMENT — SOCIAL DETERMINANTS OF HEALTH (SDOH): HOW HARD IS IT FOR YOU TO PAY FOR THE VERY BASICS LIKE FOOD, HOUSING, MEDICAL CARE, AND HEATING?: NOT HARD AT ALL

## 2023-01-10 NOTE — PROGRESS NOTES
[facility]  [address]   [address]  [address] [address]  Dept: [phone]  Dept Fax: [phone]    Vani Sommer is a 40 y.o. male who presents today for his medical conditions/complaints as noted below. Chief Complaint   Patient presents with    New Patient       HPI:     Patient presents to the office to establish care. Prior patient of Dr. Marion Ferrari. He has past medical history including hypertension, hyperlipidemia, BPH, ED. Today, patient reports he is doing well without any acute changes. Denies any lightheadedness, dizziness, shortness of breath, chest pain, leg edema, syncope. He is tolerating current medications although he does have side effect of frequent urination/nocturia, ED, intermittent fatigue. He is frustrated that he is on multiple medications for his blood pressure. Blood pressure is stable in office. Weight stable. Hypertension  This is a chronic problem. The current episode started more than 1 year ago. The problem is unchanged. The problem is controlled. Pertinent negatives include no anxiety, chest pain, headaches or shortness of breath. Past treatments include alpha 1 blockers, calcium channel blockers, diuretics, beta blockers and angiotensin blockers. The current treatment provides moderate improvement. There are no compliance problems.       Hemoglobin A1C (%)   Date Value   03/12/2021 5.3             ( goal A1C is < 7)   No results found for: LABMICR  LDL Cholesterol (mg/dL)   Date Value   05/03/2022 95   03/12/2021 116   03/05/2020 99       (goal LDL is <100)   AST (U/L)   Date Value   05/03/2022 24     ALT (U/L)   Date Value   05/03/2022 27     BUN (mg/dL)   Date Value   05/03/2022 19     BP Readings from Last 3 Encounters:   01/10/23 124/82   05/03/22 122/80   11/10/21 (!) 138/94          (goal 120/80)    Past Medical History:   Diagnosis Date    Hyperlipidemia     Hypertension       Past Surgical History: Procedure Laterality Date    FOOT SURGERY      TONSILLECTOMY AND ADENOIDECTOMY         Family History   Problem Relation Age of Onset    Heart Attack Maternal Cousin        Social History     Tobacco Use    Smoking status: Never    Smokeless tobacco: Never   Substance Use Topics    Alcohol use: Yes     Comment: rare       Current Outpatient Medications   Medication Sig Dispense Refill    vitamin D (ERGOCALCIFEROL) 1.25 MG (12072 UT) CAPS capsule TAKE ONE CAPSULE BY MOUTH ONCE WEEKLY 4 capsule 5    labetalol (NORMODYNE) 200 MG tablet TAKE ONE TABLET BY MOUTH TWICE A  tablet 0    losartan (COZAAR) 50 MG tablet TAKE ONE TABLET BY MOUTH DAILY 90 tablet 3    amLODIPine (NORVASC) 10 MG tablet TAKE ONE TABLET BY MOUTH DAILY 90 tablet 3    chlorthalidone (HYGROTON) 25 MG tablet TAKE ONE TABLET BY MOUTH DAILY 90 tablet 3    simvastatin (ZOCOR) 20 MG tablet TAKE ONE TABLET BY MOUTH ONCE NIGHTLY 90 tablet 3    tadalafil (CIALIS) 5 MG tablet TAKE ONE TABLET BY MOUTH DAILY 90 tablet 1    cloNIDine (CATAPRES) 0.1 MG tablet TAKE ONE TABLET BY MOUTH TWICE A  tablet 3    tiZANidine (ZANAFLEX) 2 MG tablet Take 1 tablet by mouth nightly as needed (back spasm) 10 tablet 0    fluticasone (FLONASE) 50 MCG/ACT nasal spray 2 sprays by Each Nostril route daily 16 g 3    tadalafil (CIALIS) 20 MG tablet Take 1 tablet by mouth daily as needed for Erectile Dysfunction 90 tablet 1     No current facility-administered medications for this visit.      No Known Allergies    Health Maintenance   Topic Date Due    COVID-19 Vaccine (1) Never done    Varicella vaccine (1 of 2 - 2-dose childhood series) Never done    HIV screen  Never done    Hepatitis C screen  Never done    DTaP/Tdap/Td vaccine (1 - Tdap) Never done    Flu vaccine (1) Never done    Lipids  05/03/2023    Depression Screen  01/10/2024    Hepatitis A vaccine  Aged Out    Hib vaccine  Aged Out    Meningococcal (ACWY) vaccine  Aged Out    Pneumococcal 0-64 years Vaccine Aged Out       Subjective:      Review of Systems   Constitutional:  Negative for chills, fatigue and fever. HENT:  Negative for congestion, rhinorrhea and sinus pain. Eyes:  Negative for pain. Respiratory:  Negative for cough and shortness of breath. Cardiovascular:  Negative for chest pain and leg swelling. Gastrointestinal:  Negative for abdominal pain, constipation, diarrhea, nausea and vomiting. Genitourinary:  Negative for difficulty urinating, enuresis and testicular pain. Musculoskeletal:  Negative for arthralgias, back pain and myalgias. Skin:  Negative for rash. Neurological:  Negative for dizziness and headaches. Psychiatric/Behavioral:  Negative for confusion and sleep disturbance. The patient is not nervous/anxious. All other systems reviewed and are negative. Objective:     Physical Exam  Vitals and nursing note reviewed. Constitutional:       General: He is not in acute distress. Appearance: Normal appearance. He is normal weight. HENT:      Head: Normocephalic. Mouth/Throat:      Mouth: Mucous membranes are moist.   Eyes:      Extraocular Movements: Extraocular movements intact. Conjunctiva/sclera: Conjunctivae normal.      Pupils: Pupils are equal, round, and reactive to light. Cardiovascular:      Rate and Rhythm: Normal rate and regular rhythm. Pulses: Normal pulses. Heart sounds: Normal heart sounds. Pulmonary:      Effort: Pulmonary effort is normal. No respiratory distress. Breath sounds: Normal breath sounds. Abdominal:      General: Abdomen is flat. Bowel sounds are normal.      Palpations: Abdomen is soft. Tenderness: There is no abdominal tenderness. Musculoskeletal:      Cervical back: Normal range of motion. Right lower leg: No edema. Left lower leg: No edema. Lymphadenopathy:      Cervical: No cervical adenopathy. Skin:     General: Skin is warm.       Capillary Refill: Capillary refill takes less than 2 seconds. Neurological:      General: No focal deficit present. Mental Status: He is alert and oriented to person, place, and time. Psychiatric:         Mood and Affect: Mood normal.         Behavior: Behavior normal.     /82   Pulse 82   Wt 196 lb (88.9 kg)   SpO2 96%   BMI 28.12 kg/m²     Assessment:       ICD-10-CM    1. Resistant hypertension  I10       2. Mixed hyperlipidemia  E78.2       3. Erectile dysfunction, unspecified erectile dysfunction type  N52.9       4. Benign prostatic hyperplasia with nocturia  N40.1     R35.1                Plan:      1. Hypertension is stable. However he is having side effect to medication. Plan to decrease chlorthalidone dose to 12.5 mg daily. I recommended he monitor blood pressure closely at home. I have discussed the importance of DASH diet, healthy eating. 2.  Patient to continue simvastatin 20 mg daily for management of hyperlipidemia.  3,4. Patient to continue tadalafil 5 mg daily for management of ED and BPH. Patient has multiple nocturia episodes. Hopefully with decreasing chlorthalidone he will have less side effects. He is to call the office in a couple weeks with BP report and symptoms. Return in about 3 months (around 4/10/2023) for physical.    No orders of the defined types were placed in this encounter. Patient given educational materials - see patient instructions. Discussed use, benefit, and side effects of prescribed medications. All patient questions answered. Pt voiced understanding. Reviewed health maintenance. Instructed to continue current medications, diet and exercise. Patient agreed with treatment plan. Follow up as directed.         Electronically signed by Halima Self PA-C on 1/10/2023 at 7:44 AM

## 2023-03-14 RX ORDER — LABETALOL 200 MG/1
TABLET, FILM COATED ORAL
Qty: 180 TABLET | Refills: 0 | Status: SHIPPED | OUTPATIENT
Start: 2023-03-14

## 2023-05-03 ENCOUNTER — HOSPITAL ENCOUNTER (OUTPATIENT)
Age: 43
Setting detail: SPECIMEN
Discharge: HOME | End: 2023-05-03

## 2023-05-03 ENCOUNTER — OFFICE VISIT (OUTPATIENT)
Dept: PRIMARY CARE CLINIC | Age: 43
End: 2023-05-03
Payer: COMMERCIAL

## 2023-05-03 VITALS
BODY MASS INDEX: 28.92 KG/M2 | SYSTOLIC BLOOD PRESSURE: 122 MMHG | DIASTOLIC BLOOD PRESSURE: 80 MMHG | OXYGEN SATURATION: 95 % | WEIGHT: 202 LBS | HEART RATE: 64 BPM | HEIGHT: 70 IN

## 2023-05-03 DIAGNOSIS — Z12.5 SCREENING PSA (PROSTATE SPECIFIC ANTIGEN): ICD-10-CM

## 2023-05-03 DIAGNOSIS — R35.1 BENIGN PROSTATIC HYPERPLASIA WITH NOCTURIA: ICD-10-CM

## 2023-05-03 DIAGNOSIS — E55.9 VITAMIN D DEFICIENCY: ICD-10-CM

## 2023-05-03 DIAGNOSIS — N40.1 BENIGN PROSTATIC HYPERPLASIA WITH NOCTURIA: ICD-10-CM

## 2023-05-03 DIAGNOSIS — I10 RESISTANT HYPERTENSION: ICD-10-CM

## 2023-05-03 DIAGNOSIS — Z00.00 ANNUAL PHYSICAL EXAM: Primary | ICD-10-CM

## 2023-05-03 DIAGNOSIS — Z00.00 ANNUAL PHYSICAL EXAM: ICD-10-CM

## 2023-05-03 DIAGNOSIS — E78.2 MIXED HYPERLIPIDEMIA: ICD-10-CM

## 2023-05-03 LAB
25(OH)D3 SERPL-MCNC: 50.8 NG/ML
ALBUMIN SERPL-MCNC: 4.5 G/DL (ref 3.5–5.2)
ALBUMIN/GLOBULIN RATIO: 1.8 (ref 1–2.5)
ALP SERPL-CCNC: 64 U/L (ref 40–129)
ALT SERPL-CCNC: 23 U/L (ref 5–41)
ANION GAP SERPL CALCULATED.3IONS-SCNC: 20 MMOL/L (ref 9–17)
AST SERPL-CCNC: 18 U/L
BILIRUB SERPL-MCNC: 0.6 MG/DL (ref 0.3–1.2)
BUN SERPL-MCNC: 20 MG/DL (ref 6–20)
CALCIUM SERPL-MCNC: 9.8 MG/DL (ref 8.6–10.4)
CHLORIDE SERPL-SCNC: 106 MMOL/L (ref 98–107)
CHOLEST SERPL-MCNC: 189 MG/DL
CHOLESTEROL/HDL RATIO: 3
CO2 SERPL-SCNC: 20 MMOL/L (ref 20–31)
CREAT SERPL-MCNC: 1.02 MG/DL (ref 0.7–1.2)
EST. AVERAGE GLUCOSE BLD GHB EST-MCNC: 103 MG/DL
GFR SERPL CREATININE-BSD FRML MDRD: >60 ML/MIN/1.73M2
GLUCOSE SERPL-MCNC: 110 MG/DL (ref 70–99)
HBA1C MFR BLD: 5.2 % (ref 4–6)
HCT VFR BLD AUTO: 45.3 % (ref 40.7–50.3)
HDLC SERPL-MCNC: 63 MG/DL
HGB BLD-MCNC: 15.6 G/DL (ref 13–17)
LDLC SERPL CALC-MCNC: 112 MG/DL (ref 0–130)
MCH RBC QN AUTO: 33.2 PG (ref 25.2–33.5)
MCHC RBC AUTO-ENTMCNC: 34.4 G/DL (ref 28.4–34.8)
MCV RBC AUTO: 96.4 FL (ref 82.6–102.9)
NRBC AUTOMATED: 0 PER 100 WBC
PDW BLD-RTO: 12.1 % (ref 11.8–14.4)
PLATELET # BLD AUTO: 189 K/UL (ref 138–453)
PMV BLD AUTO: 10.2 FL (ref 8.1–13.5)
POTASSIUM SERPL-SCNC: 4.5 MMOL/L (ref 3.7–5.3)
PROSTATE SPECIFIC ANTIGEN: 0.32 NG/ML
PROT SERPL-MCNC: 7 G/DL (ref 6.4–8.3)
RBC # BLD: 4.7 M/UL (ref 4.21–5.77)
SODIUM SERPL-SCNC: 146 MMOL/L (ref 135–144)
TRIGL SERPL-MCNC: 68 MG/DL
WBC # BLD AUTO: 5.3 K/UL (ref 3.5–11.3)

## 2023-05-03 PROCEDURE — 3079F DIAST BP 80-89 MM HG: CPT | Performed by: PHYSICIAN ASSISTANT

## 2023-05-03 PROCEDURE — 99396 PREV VISIT EST AGE 40-64: CPT | Performed by: PHYSICIAN ASSISTANT

## 2023-05-03 PROCEDURE — 3074F SYST BP LT 130 MM HG: CPT | Performed by: PHYSICIAN ASSISTANT

## 2023-05-03 SDOH — ECONOMIC STABILITY: HOUSING INSECURITY
IN THE LAST 12 MONTHS, WAS THERE A TIME WHEN YOU DID NOT HAVE A STEADY PLACE TO SLEEP OR SLEPT IN A SHELTER (INCLUDING NOW)?: NO

## 2023-05-03 SDOH — ECONOMIC STABILITY: INCOME INSECURITY: HOW HARD IS IT FOR YOU TO PAY FOR THE VERY BASICS LIKE FOOD, HOUSING, MEDICAL CARE, AND HEATING?: NOT HARD AT ALL

## 2023-05-03 SDOH — ECONOMIC STABILITY: FOOD INSECURITY: WITHIN THE PAST 12 MONTHS, THE FOOD YOU BOUGHT JUST DIDN'T LAST AND YOU DIDN'T HAVE MONEY TO GET MORE.: NEVER TRUE

## 2023-05-03 SDOH — ECONOMIC STABILITY: FOOD INSECURITY: WITHIN THE PAST 12 MONTHS, YOU WORRIED THAT YOUR FOOD WOULD RUN OUT BEFORE YOU GOT MONEY TO BUY MORE.: NEVER TRUE

## 2023-05-03 ASSESSMENT — ENCOUNTER SYMPTOMS
SINUS PAIN: 0
RHINORRHEA: 0
CONSTIPATION: 0
ABDOMINAL PAIN: 0
BACK PAIN: 0
EYE PAIN: 0
NAUSEA: 0
DIARRHEA: 0
VOMITING: 0
SHORTNESS OF BREATH: 0
COUGH: 0

## 2023-05-03 NOTE — PROGRESS NOTES
[facility]  [facility] [address]  [address] [address]  Dept: [phone]  Dept Fax: [phone]    Vinay Kearney is a 44 y.o. male who presents today for his medical conditions/complaints as noted below. Chief Complaint   Patient presents with    Annual Exam       HPI:     Patient presents to the office for annual physical exam.  He has past medical history including hypertension, BPH, hyperlipidemia, erectile dysfunction, arthritis. Overall, patient reports no changes. He is compliant with medication regimen without significant side effects. Since reducing chlorthalidone to half tablet, urinary symptoms have minimally changed. He continues to have nighttime urination, urinary frequency issues. He has not seen urology in a couple years. He is taking Cialis on a daily basis. Blood pressure stable. Denies any lightheadedness, dizziness, shortness of breath, chest pain, leg edema, syncope. He does have intermittent arthralgias. He does not want work-up at this time. BP stable. Weight stable.       Hemoglobin A1C (%)   Date Value   03/12/2021 5.3             ( goal A1C is < 7)   No results found for: LABMICR  LDL Cholesterol (mg/dL)   Date Value   05/03/2022 95   03/12/2021 116   03/05/2020 99       (goal LDL is <100)   AST (U/L)   Date Value   05/03/2022 24     ALT (U/L)   Date Value   05/03/2022 27     BUN (mg/dL)   Date Value   05/03/2022 19     BP Readings from Last 3 Encounters:   05/03/23 122/80   01/10/23 124/82   05/03/22 122/80          (goal 120/80)    Past Medical History:   Diagnosis Date    Hyperlipidemia     Hypertension       Past Surgical History:   Procedure Laterality Date    FOOT SURGERY      TONSILLECTOMY AND ADENOIDECTOMY         Family History   Problem Relation Age of Onset    Heart Attack Maternal Cousin        Social History     Tobacco Use    Smoking status: Never    Smokeless tobacco: Never   Substance Use

## 2023-05-08 ENCOUNTER — OFFICE VISIT (OUTPATIENT)
Dept: FAMILY MEDICINE CLINIC | Age: 43
End: 2023-05-08

## 2023-05-08 ENCOUNTER — HOSPITAL ENCOUNTER (EMERGENCY)
Age: 43
Discharge: HOME | End: 2023-05-08
Attending: EMERGENCY MEDICINE
Payer: COMMERCIAL

## 2023-05-08 ENCOUNTER — APPOINTMENT (OUTPATIENT)
Dept: CT IMAGING | Age: 43
End: 2023-05-08
Payer: COMMERCIAL

## 2023-05-08 VITALS
DIASTOLIC BLOOD PRESSURE: 80 MMHG | BODY MASS INDEX: 28.92 KG/M2 | WEIGHT: 202 LBS | RESPIRATION RATE: 14 BRPM | HEIGHT: 70 IN | OXYGEN SATURATION: 94 % | HEART RATE: 59 BPM | SYSTOLIC BLOOD PRESSURE: 140 MMHG

## 2023-05-08 VITALS
DIASTOLIC BLOOD PRESSURE: 65 MMHG | HEIGHT: 70 IN | SYSTOLIC BLOOD PRESSURE: 114 MMHG | RESPIRATION RATE: 16 BRPM | OXYGEN SATURATION: 97 % | TEMPERATURE: 97.4 F | WEIGHT: 200 LBS | HEART RATE: 83 BPM | BODY MASS INDEX: 28.63 KG/M2

## 2023-05-08 DIAGNOSIS — S09.90XA CLOSED HEAD INJURY, INITIAL ENCOUNTER: Primary | ICD-10-CM

## 2023-05-08 DIAGNOSIS — S06.0X0A CONCUSSION WITHOUT LOSS OF CONSCIOUSNESS, INITIAL ENCOUNTER: Primary | ICD-10-CM

## 2023-05-08 DIAGNOSIS — R11.11 VOMITING WITHOUT NAUSEA, UNSPECIFIED VOMITING TYPE: ICD-10-CM

## 2023-05-08 PROBLEM — N32.81 OAB (OVERACTIVE BLADDER): Status: ACTIVE | Noted: 2023-05-08

## 2023-05-08 PROCEDURE — 72125 CT NECK SPINE W/O DYE: CPT

## 2023-05-08 PROCEDURE — 70450 CT HEAD/BRAIN W/O DYE: CPT

## 2023-05-08 PROCEDURE — 6360000002 HC RX W HCPCS: Performed by: PHYSICIAN ASSISTANT

## 2023-05-08 PROCEDURE — 99999 PR OFFICE/OUTPT VISIT,PROCEDURE ONLY: CPT | Performed by: NURSE PRACTITIONER

## 2023-05-08 PROCEDURE — 96375 TX/PRO/DX INJ NEW DRUG ADDON: CPT

## 2023-05-08 PROCEDURE — 99284 EMERGENCY DEPT VISIT MOD MDM: CPT

## 2023-05-08 PROCEDURE — 96374 THER/PROPH/DIAG INJ IV PUSH: CPT

## 2023-05-08 RX ORDER — DEXAMETHASONE SODIUM PHOSPHATE 10 MG/ML
8 INJECTION, SOLUTION INTRAMUSCULAR; INTRAVENOUS ONCE
Status: COMPLETED | OUTPATIENT
Start: 2023-05-08 | End: 2023-05-08

## 2023-05-08 RX ORDER — KETOROLAC TROMETHAMINE 30 MG/ML
30 INJECTION, SOLUTION INTRAMUSCULAR; INTRAVENOUS ONCE
Status: COMPLETED | OUTPATIENT
Start: 2023-05-08 | End: 2023-05-08

## 2023-05-08 RX ORDER — ONDANSETRON 4 MG/1
4 TABLET, ORALLY DISINTEGRATING ORAL ONCE
Status: DISCONTINUED | OUTPATIENT
Start: 2023-05-08 | End: 2023-05-08

## 2023-05-08 RX ORDER — ONDANSETRON 2 MG/ML
4 INJECTION INTRAMUSCULAR; INTRAVENOUS ONCE
Status: DISCONTINUED | OUTPATIENT
Start: 2023-05-08 | End: 2023-05-08

## 2023-05-08 RX ORDER — PROCHLORPERAZINE EDISYLATE 5 MG/ML
10 INJECTION INTRAMUSCULAR; INTRAVENOUS ONCE
Status: COMPLETED | OUTPATIENT
Start: 2023-05-08 | End: 2023-05-08

## 2023-05-08 RX ORDER — DIPHENHYDRAMINE HYDROCHLORIDE 50 MG/ML
25 INJECTION INTRAMUSCULAR; INTRAVENOUS ONCE
Status: COMPLETED | OUTPATIENT
Start: 2023-05-08 | End: 2023-05-08

## 2023-05-08 RX ADMIN — KETOROLAC TROMETHAMINE 30 MG: 30 INJECTION, SOLUTION INTRAMUSCULAR; INTRAVENOUS at 21:35

## 2023-05-08 RX ADMIN — DEXAMETHASONE SODIUM PHOSPHATE 8 MG: 10 INJECTION, SOLUTION INTRAMUSCULAR; INTRAVENOUS at 21:36

## 2023-05-08 RX ADMIN — DIPHENHYDRAMINE HYDROCHLORIDE 25 MG: 50 INJECTION, SOLUTION INTRAMUSCULAR; INTRAVENOUS at 20:35

## 2023-05-08 RX ADMIN — PROCHLORPERAZINE EDISYLATE 10 MG: 5 INJECTION, SOLUTION INTRAMUSCULAR; INTRAVENOUS at 20:37

## 2023-05-08 ASSESSMENT — PAIN DESCRIPTION - LOCATION: LOCATION: HEAD

## 2023-05-08 ASSESSMENT — ENCOUNTER SYMPTOMS: VOMITING: 1

## 2023-05-08 ASSESSMENT — PAIN DESCRIPTION - DESCRIPTORS: DESCRIPTORS: PRESSURE

## 2023-05-08 ASSESSMENT — PAIN SCALES - GENERAL: PAINLEVEL_OUTOF10: 10

## 2023-05-08 ASSESSMENT — PAIN - FUNCTIONAL ASSESSMENT: PAIN_FUNCTIONAL_ASSESSMENT: 0-10

## 2023-05-09 ENCOUNTER — TELEPHONE (OUTPATIENT)
Dept: PRIMARY CARE CLINIC | Age: 43
End: 2023-05-09

## 2023-05-09 RX ORDER — ONDANSETRON 4 MG/1
4 TABLET, ORALLY DISINTEGRATING ORAL EVERY 8 HOURS PRN
Qty: 10 TABLET | Refills: 0 | Status: SHIPPED | OUTPATIENT
Start: 2023-05-09

## 2023-05-09 NOTE — ED PROVIDER NOTES
a more malignant underlying process, but also understands that early in the process of an illness or injury, an emergency department work-up can be falsely reassuring. Routine discharge counseling was given, and the patient understands that worsening, changing or persistent symptoms should prompt a immediate call or follow-up with their primary care physician or return to the emergency department. The importance of appropriate follow-up was also discussed. I have reviewed the disposition diagnosis with the patient. I have answered their questions and given discharge instructions. They voiced understanding of these instructions and did not have any further questions or complaints. Diagnosis:  1.  Concussion without loss of consciousness, initial encounter            (Please note that portions of this note were completed with a voice recognition program.  Efforts were made to edit the dictations but occasionally words are mis-transcribed.)    Autumn Parks DO, [facility]  Attending Emergency Medicine Physician        Autumn Parks DO  05/09/23 5982
head was performed without the administration of intravenous contrast. Automated exposure control, iterative reconstruction, and/or weight based adjustment of the mA/kV was utilized to reduce the radiation dose to as low as reasonably achievable. COMPARISON: None. HISTORY: ORDERING SYSTEM PROVIDED HISTORY: Hit head yesterday - Sudden onset HA w/ associated vomiting TECHNOLOGIST PROVIDED HISTORY: Hit head yesterday - Sudden onset HA w/ associated vomiting Reason for Exam: Hit head yesterday - Sudden onset HA w/ associated vomiting FINDINGS: BRAIN/VENTRICLES: There is no acute intracranial hemorrhage, mass effect or midline shift. No abnormal extra-axial fluid collection. The gray-white differentiation is maintained without evidence of an acute infarct. There is no evidence of hydrocephalus. ORBITS: The visualized portion of the orbits demonstrate no acute abnormality. SINUSES: The visualized paranasal sinuses and mastoid air cells demonstrate no acute abnormality. SOFT TISSUES/SKULL:  No acute abnormality of the visualized skull or soft tissues. No acute intracranial abnormality. RECOMMENDATIONS: Unavailable     CT CERVICAL SPINE WO CONTRAST    Result Date: 5/8/2023  EXAMINATION: CT OF THE CERVICAL SPINE WITHOUT CONTRAST 5/8/2023 8:16 pm TECHNIQUE: CT of the cervical spine was performed without the administration of intravenous contrast. Multiplanar reformatted images are provided for review. Automated exposure control, iterative reconstruction, and/or weight based adjustment of the mA/kV was utilized to reduce the radiation dose to as low as reasonably achievable. COMPARISON: None.  HISTORY: ORDERING SYSTEM PROVIDED HISTORY: Hit back of head last night - associated N/V TECHNOLOGIST PROVIDED HISTORY: Hit back of head last night - associated N/V Decision Support Exception - unselect if not a suspected or confirmed emergency medical condition->Emergency Medical Condition (MA) Reason for Exam: Hit head yesterday -

## 2023-05-09 NOTE — TELEPHONE ENCOUNTER
[facility] ([facility]) ED Follow up Call    Reason for ED visit:  Concussion without loss of consciousness, initial encounter        Hi Kenton Kanner , this is [place] from Dr. Kolb Smoker office, just calling to see how you are doing after your recent ED visit. Did you receive discharge instructions? yes  Do you understand the discharge instructions? yes  Did the ED give you any new prescriptions? nono  Were you able to fill your prescriptions? Do you have one of our red, yellow and green  Zone sheets that help you to determine when you should go to the ED? na      Do you need or want to make a follow up appt with your PCP?  no    Do you have any further needs in the home i.e. Equipment? na    FU appts/Provider:    Future Appointments   Date Time Provider Carmela Lauren   11/8/2023  7:15 AM RIDDHI Borden Repress

## 2023-05-16 DIAGNOSIS — I10 ESSENTIAL HYPERTENSION: ICD-10-CM

## 2023-05-16 RX ORDER — CLONIDINE HYDROCHLORIDE 0.1 MG/1
TABLET ORAL
Qty: 180 TABLET | Refills: 3 | Status: SHIPPED | OUTPATIENT
Start: 2023-05-16

## 2023-06-09 RX ORDER — LABETALOL 200 MG/1
TABLET, FILM COATED ORAL
Qty: 180 TABLET | Refills: 0 | Status: SHIPPED | OUTPATIENT
Start: 2023-06-09

## 2023-07-17 DIAGNOSIS — N52.9 ERECTILE DYSFUNCTION, UNSPECIFIED ERECTILE DYSFUNCTION TYPE: ICD-10-CM

## 2023-07-17 RX ORDER — TADALAFIL 20 MG/1
20 TABLET ORAL DAILY PRN
Qty: 90 TABLET | Refills: 1 | Status: SHIPPED | OUTPATIENT
Start: 2023-07-17

## 2023-08-23 DIAGNOSIS — E78.2 MIXED HYPERLIPIDEMIA: ICD-10-CM

## 2023-08-23 RX ORDER — SIMVASTATIN 20 MG
TABLET ORAL
Qty: 90 TABLET | Refills: 3 | Status: SHIPPED | OUTPATIENT
Start: 2023-08-23

## 2023-09-08 RX ORDER — LABETALOL 200 MG/1
TABLET, FILM COATED ORAL
Qty: 180 TABLET | Refills: 0 | Status: SHIPPED | OUTPATIENT
Start: 2023-09-08

## 2023-10-10 RX ORDER — AMLODIPINE BESYLATE 10 MG/1
TABLET ORAL
Qty: 90 TABLET | Refills: 3 | Status: SHIPPED | OUTPATIENT
Start: 2023-10-10

## 2023-10-11 DIAGNOSIS — E55.9 VITAMIN D DEFICIENCY: ICD-10-CM

## 2023-10-11 RX ORDER — ERGOCALCIFEROL 1.25 MG/1
CAPSULE ORAL
Qty: 12 CAPSULE | Refills: 0 | Status: SHIPPED | OUTPATIENT
Start: 2023-10-11

## 2023-10-23 DIAGNOSIS — I1A.0 RESISTANT HYPERTENSION: ICD-10-CM

## 2023-10-23 RX ORDER — CHLORTHALIDONE 25 MG/1
12.5 TABLET ORAL DAILY
Qty: 90 TABLET | Refills: 3 | Status: SHIPPED | OUTPATIENT
Start: 2023-10-23

## 2023-10-26 RX ORDER — LOSARTAN POTASSIUM 50 MG/1
50 TABLET ORAL DAILY
Qty: 90 TABLET | Refills: 3 | Status: SHIPPED | OUTPATIENT
Start: 2023-10-26

## 2023-11-22 ENCOUNTER — TELEPHONE (OUTPATIENT)
Dept: PRIMARY CARE CLINIC | Age: 43
End: 2023-11-22

## 2023-11-22 NOTE — TELEPHONE ENCOUNTER
The pharmacy faxed the office asking if the patient is to be taking 1/2 tablet or 1 full tablet daily for  the chlorthalidone 25 mg. Please advise.

## 2023-12-07 RX ORDER — LABETALOL 200 MG/1
TABLET, FILM COATED ORAL
Qty: 180 TABLET | Refills: 0 | Status: SHIPPED | OUTPATIENT
Start: 2023-12-07

## 2023-12-07 NOTE — TELEPHONE ENCOUNTER
Last Visit Date: 5/3/2023   Next Visit Date: Visit date not found
The patient is a 56y Male complaining of diarrhea.

## 2024-03-07 DIAGNOSIS — E55.9 VITAMIN D DEFICIENCY: ICD-10-CM

## 2024-03-07 RX ORDER — ERGOCALCIFEROL 1.25 MG/1
CAPSULE ORAL
Qty: 8 CAPSULE | Refills: 0 | Status: SHIPPED | OUTPATIENT
Start: 2024-03-07

## 2024-03-11 RX ORDER — LABETALOL 200 MG/1
TABLET, FILM COATED ORAL
Qty: 180 TABLET | Refills: 0 | Status: SHIPPED | OUTPATIENT
Start: 2024-03-11

## 2024-05-05 DIAGNOSIS — E55.9 VITAMIN D DEFICIENCY: ICD-10-CM

## 2024-05-06 RX ORDER — ERGOCALCIFEROL 1.25 MG/1
CAPSULE ORAL
Qty: 8 CAPSULE | Refills: 0 | Status: SHIPPED | OUTPATIENT
Start: 2024-05-06

## 2024-05-14 DIAGNOSIS — I10 ESSENTIAL HYPERTENSION: ICD-10-CM

## 2024-05-14 RX ORDER — CLONIDINE HYDROCHLORIDE 0.1 MG/1
TABLET ORAL
Qty: 180 TABLET | Refills: 3 | OUTPATIENT
Start: 2024-05-14

## 2024-05-15 DIAGNOSIS — I10 ESSENTIAL HYPERTENSION: ICD-10-CM

## 2024-05-15 RX ORDER — CLONIDINE HYDROCHLORIDE 0.1 MG/1
TABLET ORAL
Qty: 30 TABLET | Refills: 0 | Status: SHIPPED | OUTPATIENT
Start: 2024-05-15

## 2024-05-28 DIAGNOSIS — I10 ESSENTIAL HYPERTENSION: ICD-10-CM

## 2024-05-28 RX ORDER — CLONIDINE HYDROCHLORIDE 0.1 MG/1
TABLET ORAL
Qty: 30 TABLET | Refills: 0 | OUTPATIENT
Start: 2024-05-28

## 2024-05-30 DIAGNOSIS — I10 ESSENTIAL HYPERTENSION: ICD-10-CM

## 2024-05-30 RX ORDER — CLONIDINE HYDROCHLORIDE 0.1 MG/1
TABLET ORAL
Qty: 30 TABLET | Refills: 0 | OUTPATIENT
Start: 2024-05-30

## 2024-06-02 DIAGNOSIS — I10 ESSENTIAL HYPERTENSION: ICD-10-CM

## 2024-06-03 DIAGNOSIS — E55.9 VITAMIN D DEFICIENCY: ICD-10-CM

## 2024-06-03 DIAGNOSIS — I10 ESSENTIAL HYPERTENSION: ICD-10-CM

## 2024-06-03 DIAGNOSIS — E78.2 MIXED HYPERLIPIDEMIA: ICD-10-CM

## 2024-06-03 DIAGNOSIS — I1A.0 RESISTANT HYPERTENSION: ICD-10-CM

## 2024-06-03 DIAGNOSIS — N52.9 ERECTILE DYSFUNCTION, UNSPECIFIED ERECTILE DYSFUNCTION TYPE: ICD-10-CM

## 2024-06-03 RX ORDER — CLONIDINE HYDROCHLORIDE 0.1 MG/1
TABLET ORAL
Qty: 30 TABLET | Refills: 0 | OUTPATIENT
Start: 2024-06-03

## 2024-06-03 NOTE — TELEPHONE ENCOUNTER
Patient called in to get medication refill until his appointment .     Please advise Last OV:  5/3/2023    Next OV: 7/11/2024    Pharmacy:   [facility] [id number] - [address] [phone] - F [phone]  [facility] [address]  Phone: [phone] Fax: [phone]       Confirmed? Yes

## 2024-06-04 RX ORDER — AMLODIPINE BESYLATE 10 MG/1
TABLET ORAL
Qty: 30 TABLET | Refills: 0 | Status: SHIPPED | OUTPATIENT
Start: 2024-06-04

## 2024-06-04 RX ORDER — CLONIDINE HYDROCHLORIDE 0.1 MG/1
0.1 TABLET ORAL 2 TIMES DAILY
Qty: 60 TABLET | Refills: 0 | Status: SHIPPED | OUTPATIENT
Start: 2024-06-04

## 2024-06-04 RX ORDER — CHLORTHALIDONE 25 MG/1
12.5 TABLET ORAL DAILY
Qty: 15 TABLET | Refills: 0 | Status: SHIPPED | OUTPATIENT
Start: 2024-06-04

## 2024-06-04 RX ORDER — ERGOCALCIFEROL 1.25 MG/1
50000 CAPSULE ORAL WEEKLY
Qty: 4 CAPSULE | Refills: 0 | Status: SHIPPED | OUTPATIENT
Start: 2024-06-04 | End: 2024-07-04

## 2024-06-04 RX ORDER — LOSARTAN POTASSIUM 50 MG/1
50 TABLET ORAL DAILY
Qty: 30 TABLET | Refills: 0 | Status: SHIPPED | OUTPATIENT
Start: 2024-06-04

## 2024-06-04 RX ORDER — SIMVASTATIN 20 MG
TABLET ORAL
Qty: 30 TABLET | Refills: 0 | Status: SHIPPED | OUTPATIENT
Start: 2024-06-04

## 2024-06-04 RX ORDER — LABETALOL 200 MG/1
200 TABLET, FILM COATED ORAL 2 TIMES DAILY
Qty: 30 TABLET | Refills: 0 | Status: SHIPPED | OUTPATIENT
Start: 2024-06-04

## 2024-06-05 DIAGNOSIS — I10 ESSENTIAL HYPERTENSION: ICD-10-CM

## 2024-06-05 RX ORDER — CLONIDINE HYDROCHLORIDE 0.1 MG/1
0.1 TABLET ORAL 2 TIMES DAILY
Qty: 14 TABLET | OUTPATIENT
Start: 2024-06-05

## 2024-06-07 NOTE — TELEPHONE ENCOUNTER
Pt wife informed. Said normally he got 90 day scripts, asked why only 30 this time. Informed her he hasn't been seen in over a year so once he's seen again can be sent for 90. She said he got letters in the mail every time he did have an appt saying it was canceled by provider. She asked if it can just be scheduled as a regular office visit and not physical to get in sooner, or see a different provider that can see him before the holiday ? Please advise

## 2024-06-07 NOTE — TELEPHONE ENCOUNTER
Pt's wife Libby called into office stating pt's medication Vit-D and Chlorthalidone refill that was sent in was just 5 capsule and 15 tabs. She stated patient needs at least 30 days what of medication to hold him until his appointment in July.   She is asking why those where not 30 days like the other ones.     Please advise  Thanks

## 2024-06-10 RX ORDER — LABETALOL 200 MG/1
200 TABLET, FILM COATED ORAL 2 TIMES DAILY
Qty: 60 TABLET | Refills: 0 | Status: SHIPPED | OUTPATIENT
Start: 2024-06-10

## 2024-06-30 DIAGNOSIS — E55.9 VITAMIN D DEFICIENCY: ICD-10-CM

## 2024-07-01 RX ORDER — ERGOCALCIFEROL 1.25 MG/1
CAPSULE ORAL
Qty: 4 CAPSULE | Refills: 0 | Status: SHIPPED | OUTPATIENT
Start: 2024-07-01

## 2024-07-02 DIAGNOSIS — I10 ESSENTIAL HYPERTENSION: ICD-10-CM

## 2024-07-02 DIAGNOSIS — E78.2 MIXED HYPERLIPIDEMIA: ICD-10-CM

## 2024-07-02 RX ORDER — SIMVASTATIN 20 MG
TABLET ORAL
Qty: 30 TABLET | Refills: 0 | Status: SHIPPED | OUTPATIENT
Start: 2024-07-02

## 2024-07-02 RX ORDER — CLONIDINE HYDROCHLORIDE 0.1 MG/1
0.1 TABLET ORAL 2 TIMES DAILY
Qty: 60 TABLET | Refills: 0 | Status: SHIPPED | OUTPATIENT
Start: 2024-07-02

## 2024-07-11 ENCOUNTER — OFFICE VISIT (OUTPATIENT)
Dept: PRIMARY CARE CLINIC | Age: 44
End: 2024-07-11
Payer: COMMERCIAL

## 2024-07-11 ENCOUNTER — HOSPITAL ENCOUNTER (OUTPATIENT)
Age: 44
Setting detail: SPECIMEN
Discharge: HOME | End: 2024-07-11

## 2024-07-11 VITALS
HEIGHT: 70 IN | DIASTOLIC BLOOD PRESSURE: 76 MMHG | BODY MASS INDEX: 27.23 KG/M2 | SYSTOLIC BLOOD PRESSURE: 116 MMHG | RESPIRATION RATE: 16 BRPM | HEART RATE: 68 BPM | WEIGHT: 190.2 LBS | OXYGEN SATURATION: 94 %

## 2024-07-11 DIAGNOSIS — Z00.00 ANNUAL PHYSICAL EXAM: Primary | ICD-10-CM

## 2024-07-11 DIAGNOSIS — E55.9 VITAMIN D DEFICIENCY: ICD-10-CM

## 2024-07-11 DIAGNOSIS — E78.2 MIXED HYPERLIPIDEMIA: ICD-10-CM

## 2024-07-11 DIAGNOSIS — N52.9 ERECTILE DYSFUNCTION, UNSPECIFIED ERECTILE DYSFUNCTION TYPE: ICD-10-CM

## 2024-07-11 DIAGNOSIS — I10 ESSENTIAL HYPERTENSION: ICD-10-CM

## 2024-07-11 LAB
ALBUMIN SERPL-MCNC: 4.8 G/DL (ref 3.5–5.2)
ALBUMIN/GLOB SERPL: 2 {RATIO} (ref 1–2.5)
ALP SERPL-CCNC: 59 U/L (ref 40–129)
ALT SERPL-CCNC: 19 U/L (ref 10–50)
ANION GAP SERPL CALCULATED.3IONS-SCNC: 10 MMOL/L (ref 9–16)
AST SERPL-CCNC: 18 U/L (ref 10–50)
BILIRUB SERPL-MCNC: 1.1 MG/DL (ref 0–1.2)
BUN SERPL-MCNC: 18 MG/DL (ref 6–20)
CALCIUM SERPL-MCNC: 9.4 MG/DL (ref 8.6–10.4)
CHLORIDE SERPL-SCNC: 101 MMOL/L (ref 98–107)
CHOLEST SERPL-MCNC: 189 MG/DL (ref 0–199)
CHOLESTEROL/HDL RATIO: 3
CO2 SERPL-SCNC: 26 MMOL/L (ref 20–31)
CREAT SERPL-MCNC: 1 MG/DL (ref 0.7–1.2)
GFR, ESTIMATED: >90 ML/MIN/1.73M2
GLUCOSE P FAST SERPL-MCNC: 105 MG/DL (ref 74–99)
HDLC SERPL-MCNC: 58 MG/DL
LDLC SERPL CALC-MCNC: 119 MG/DL (ref 0–100)
POTASSIUM SERPL-SCNC: 4.2 MMOL/L (ref 3.7–5.3)
PROT SERPL-MCNC: 7.1 G/DL (ref 6.6–8.7)
SODIUM SERPL-SCNC: 137 MMOL/L (ref 136–145)
TRIGL SERPL-MCNC: 58 MG/DL
VLDLC SERPL CALC-MCNC: 12 MG/DL

## 2024-07-11 PROCEDURE — 99396 PREV VISIT EST AGE 40-64: CPT | Performed by: PHYSICIAN ASSISTANT

## 2024-07-11 PROCEDURE — 3078F DIAST BP <80 MM HG: CPT | Performed by: PHYSICIAN ASSISTANT

## 2024-07-11 PROCEDURE — 3074F SYST BP LT 130 MM HG: CPT | Performed by: PHYSICIAN ASSISTANT

## 2024-07-11 RX ORDER — TADALAFIL 10 MG/1
10 TABLET ORAL DAILY
Qty: 90 TABLET | Refills: 1 | Status: SHIPPED | OUTPATIENT
Start: 2024-07-11

## 2024-07-11 RX ORDER — SIMVASTATIN 20 MG
20 TABLET ORAL NIGHTLY
Qty: 90 TABLET | Refills: 1 | Status: SHIPPED | OUTPATIENT
Start: 2024-07-11

## 2024-07-11 RX ORDER — CHLORTHALIDONE 25 MG/1
12.5 TABLET ORAL DAILY
Qty: 45 TABLET | Refills: 1 | Status: CANCELLED | OUTPATIENT
Start: 2024-07-11

## 2024-07-11 RX ORDER — CLONIDINE HYDROCHLORIDE 0.1 MG/1
0.1 TABLET ORAL 2 TIMES DAILY
Qty: 180 TABLET | Refills: 1 | Status: SHIPPED | OUTPATIENT
Start: 2024-07-11

## 2024-07-11 RX ORDER — TADALAFIL 20 MG/1
20 TABLET ORAL DAILY PRN
Qty: 90 TABLET | Refills: 1 | Status: CANCELLED | OUTPATIENT
Start: 2024-07-11

## 2024-07-11 RX ORDER — AMLODIPINE BESYLATE 10 MG/1
TABLET ORAL
Qty: 90 TABLET | Refills: 1 | Status: SHIPPED | OUTPATIENT
Start: 2024-07-11

## 2024-07-11 RX ORDER — ERGOCALCIFEROL 1.25 MG/1
CAPSULE ORAL
Qty: 12 CAPSULE | Refills: 1 | Status: SHIPPED | OUTPATIENT
Start: 2024-07-11

## 2024-07-11 RX ORDER — LOSARTAN POTASSIUM 50 MG/1
50 TABLET ORAL DAILY
Qty: 90 TABLET | Refills: 1 | Status: SHIPPED | OUTPATIENT
Start: 2024-07-11

## 2024-07-11 RX ORDER — LABETALOL 200 MG/1
200 TABLET, FILM COATED ORAL 2 TIMES DAILY
Qty: 180 TABLET | Refills: 1 | Status: SHIPPED | OUTPATIENT
Start: 2024-07-11

## 2024-07-11 SDOH — ECONOMIC STABILITY: FOOD INSECURITY: WITHIN THE PAST 12 MONTHS, YOU WORRIED THAT YOUR FOOD WOULD RUN OUT BEFORE YOU GOT MONEY TO BUY MORE.: NEVER TRUE

## 2024-07-11 SDOH — ECONOMIC STABILITY: FOOD INSECURITY: WITHIN THE PAST 12 MONTHS, THE FOOD YOU BOUGHT JUST DIDN'T LAST AND YOU DIDN'T HAVE MONEY TO GET MORE.: NEVER TRUE

## 2024-07-11 SDOH — ECONOMIC STABILITY: INCOME INSECURITY: HOW HARD IS IT FOR YOU TO PAY FOR THE VERY BASICS LIKE FOOD, HOUSING, MEDICAL CARE, AND HEATING?: NOT HARD AT ALL

## 2024-07-11 ASSESSMENT — PATIENT HEALTH QUESTIONNAIRE - PHQ9
SUM OF ALL RESPONSES TO PHQ QUESTIONS 1-9: 0
1. LITTLE INTEREST OR PLEASURE IN DOING THINGS: NOT AT ALL
2. FEELING DOWN, DEPRESSED OR HOPELESS: NOT AT ALL
SUM OF ALL RESPONSES TO PHQ9 QUESTIONS 1 & 2: 0

## 2024-07-11 ASSESSMENT — ENCOUNTER SYMPTOMS
BACK PAIN: 0
CONSTIPATION: 0
ABDOMINAL PAIN: 0
SINUS PAIN: 0
NAUSEA: 0
DIARRHEA: 0
VOMITING: 0
COUGH: 0
EYE PAIN: 0
RHINORRHEA: 0
SHORTNESS OF BREATH: 0

## 2024-07-11 NOTE — PROGRESS NOTES
[facility]  [facility]  [address] [place] [address] [address]  [address]  [facility] [address]  Dept: [phone]  Dept Fax: [phone]    Harjit Sanchez Jr. is a 44 y.o. male who presents today for his medical conditions/complaints as noted below.    Chief Complaint   Patient presents with    Annual Exam     HPI:     Patient presents to the office for annual physical exam.  He has past medical history including hypertension, BPH, hyperlipidemia, erectile dysfunction, arthritis.  Complaining of excessive urination hasn't seen urologist in awhile. Was given Cialis previously by urologist for this but did not feel like it helped tremendously.  Needs medication refills for HTN. States he does become dizzy occasionally when bending down to reach things or is upside down for brief periods of time.  Has lost 10lbs since last visit.    Overall, patient reports no changes.  He is compliant with medication regimen without significant side effects.   Blood pressure stable.  Denies any shortness of breath, chest pain, leg edema, syncope.  He does have intermittent arthralgias.  He does not want work-up at this time.     BP stable.  Weight stable.      Hypertension  This is a chronic problem. The current episode started more than 1 year ago. The problem has been resolved since onset. The problem is controlled. Pertinent negatives include no anxiety, chest pain, headaches, peripheral edema or shortness of breath. There are no associated agents to hypertension. Risk factors for coronary artery disease include male gender. The current treatment provides significant improvement. There are no compliance problems.      Hemoglobin A1C (%)   Date Value   05/03/2023 5.2   03/12/2021 5.3             ( goal A1C is < 7)   No components found for: \"LABMICR\"  No components found for: \"LDLCHOLESTEROL\", \"LDLCALC\"    (goal LDL is <100)   AST (U/L)   Date Value   05/03/2023 18     ALT (U/L)   Date Value   05/03/2023 23

## 2025-01-05 DIAGNOSIS — I10 ESSENTIAL HYPERTENSION: ICD-10-CM

## 2025-01-07 RX ORDER — CLONIDINE HYDROCHLORIDE 0.1 MG/1
0.1 TABLET ORAL 2 TIMES DAILY
Qty: 180 TABLET | Refills: 1 | Status: SHIPPED | OUTPATIENT
Start: 2025-01-07

## 2025-01-07 RX ORDER — LABETALOL 200 MG/1
200 TABLET, FILM COATED ORAL 2 TIMES DAILY
Qty: 180 TABLET | Refills: 1 | Status: SHIPPED | OUTPATIENT
Start: 2025-01-07

## 2025-01-07 RX ORDER — LOSARTAN POTASSIUM 50 MG/1
50 TABLET ORAL DAILY
Qty: 90 TABLET | Refills: 1 | Status: SHIPPED | OUTPATIENT
Start: 2025-01-07

## 2025-01-11 DIAGNOSIS — N52.9 ERECTILE DYSFUNCTION, UNSPECIFIED ERECTILE DYSFUNCTION TYPE: ICD-10-CM

## 2025-01-13 RX ORDER — TADALAFIL 10 MG/1
10 TABLET ORAL DAILY
Qty: 90 TABLET | Refills: 1 | Status: SHIPPED | OUTPATIENT
Start: 2025-01-13

## 2025-03-23 DIAGNOSIS — E78.2 MIXED HYPERLIPIDEMIA: ICD-10-CM

## 2025-03-23 DIAGNOSIS — I10 ESSENTIAL HYPERTENSION: ICD-10-CM

## 2025-03-24 RX ORDER — AMLODIPINE BESYLATE 10 MG/1
10 TABLET ORAL DAILY
Qty: 90 TABLET | Refills: 1 | Status: SHIPPED | OUTPATIENT
Start: 2025-03-24

## 2025-03-24 RX ORDER — SIMVASTATIN 20 MG
20 TABLET ORAL NIGHTLY
Qty: 90 TABLET | Refills: 1 | Status: SHIPPED | OUTPATIENT
Start: 2025-03-24

## 2025-04-13 DIAGNOSIS — E55.9 VITAMIN D DEFICIENCY: ICD-10-CM

## 2025-04-14 RX ORDER — ERGOCALCIFEROL 1.25 MG/1
CAPSULE, LIQUID FILLED ORAL
Qty: 12 CAPSULE | Refills: 1 | Status: SHIPPED | OUTPATIENT
Start: 2025-04-14

## 2025-04-14 NOTE — TELEPHONE ENCOUNTER
Last OV:  7/11/2024    Next OV: 7/15/2025    Pharmacy:   [facility] [id number] - [place], [place] - [address] [place] [address] [phone] - F [phone]  [address] [facility] [address]  Phone: [phone] Fax: [phone]       Confirmed? Yes

## 2025-05-22 DIAGNOSIS — I10 ESSENTIAL HYPERTENSION: ICD-10-CM

## 2025-05-22 RX ORDER — LOSARTAN POTASSIUM 50 MG/1
50 TABLET ORAL DAILY
Qty: 90 TABLET | Refills: 1 | Status: SHIPPED | OUTPATIENT
Start: 2025-05-22

## 2025-07-15 ENCOUNTER — OFFICE VISIT (OUTPATIENT)
Dept: PRIMARY CARE CLINIC | Age: 45
End: 2025-07-15
Payer: COMMERCIAL

## 2025-07-15 ENCOUNTER — HOSPITAL ENCOUNTER (OUTPATIENT)
Age: 45
Setting detail: SPECIMEN
Discharge: HOME | End: 2025-07-15

## 2025-07-15 VITALS
WEIGHT: 191.4 LBS | HEART RATE: 62 BPM | RESPIRATION RATE: 16 BRPM | DIASTOLIC BLOOD PRESSURE: 74 MMHG | HEIGHT: 70 IN | BODY MASS INDEX: 27.4 KG/M2 | SYSTOLIC BLOOD PRESSURE: 120 MMHG | OXYGEN SATURATION: 96 %

## 2025-07-15 DIAGNOSIS — L73.9 FOLLICULITIS: ICD-10-CM

## 2025-07-15 DIAGNOSIS — K64.4 EXTERNAL HEMORRHOIDS: ICD-10-CM

## 2025-07-15 DIAGNOSIS — N52.9 ERECTILE DYSFUNCTION, UNSPECIFIED ERECTILE DYSFUNCTION TYPE: ICD-10-CM

## 2025-07-15 DIAGNOSIS — I10 ESSENTIAL HYPERTENSION: ICD-10-CM

## 2025-07-15 DIAGNOSIS — Z00.00 ANNUAL PHYSICAL EXAM: Primary | ICD-10-CM

## 2025-07-15 DIAGNOSIS — E78.2 MIXED HYPERLIPIDEMIA: ICD-10-CM

## 2025-07-15 LAB
ALBUMIN SERPL-MCNC: 4.5 G/DL (ref 3.5–5.2)
ALBUMIN/GLOB SERPL: 2.1 {RATIO} (ref 1–2.5)
ALP SERPL-CCNC: 62 U/L (ref 40–129)
ALT SERPL-CCNC: 19 U/L (ref 10–50)
ANION GAP SERPL CALCULATED.3IONS-SCNC: 11 MMOL/L (ref 9–16)
AST SERPL-CCNC: 18 U/L (ref 10–50)
BASOPHILS # BLD: 0.05 K/UL (ref 0–0.2)
BASOPHILS NFR BLD: 1 % (ref 0–2)
BILIRUB SERPL-MCNC: 1 MG/DL (ref 0–1.2)
BUN SERPL-MCNC: 15 MG/DL (ref 6–20)
CALCIUM SERPL-MCNC: 9 MG/DL (ref 8.6–10.4)
CHLORIDE SERPL-SCNC: 105 MMOL/L (ref 98–107)
CHOLEST SERPL-MCNC: 173 MG/DL (ref 0–199)
CHOLESTEROL/HDL RATIO: 2.9
CO2 SERPL-SCNC: 24 MMOL/L (ref 20–31)
CREAT SERPL-MCNC: 1 MG/DL (ref 0.7–1.2)
EOSINOPHIL # BLD: 0.4 K/UL (ref 0–0.44)
EOSINOPHILS RELATIVE PERCENT: 9 % (ref 1–4)
ERYTHROCYTE [DISTWIDTH] IN BLOOD BY AUTOMATED COUNT: 12.3 % (ref 11.8–14.4)
GFR, ESTIMATED: >90 ML/MIN/1.73M2
GLUCOSE P FAST SERPL-MCNC: 110 MG/DL (ref 74–99)
HCT VFR BLD AUTO: 42.3 % (ref 40.7–50.3)
HDLC SERPL-MCNC: 59 MG/DL
HGB BLD-MCNC: 14.5 G/DL (ref 13–17)
IMM GRANULOCYTES # BLD AUTO: <0.03 K/UL (ref 0–0.3)
IMM GRANULOCYTES NFR BLD: 0 %
LDLC SERPL CALC-MCNC: 106 MG/DL (ref 0–100)
LYMPHOCYTES NFR BLD: 1.08 K/UL (ref 1.1–3.7)
LYMPHOCYTES RELATIVE PERCENT: 25 % (ref 24–43)
MCH RBC QN AUTO: 32.7 PG (ref 25.2–33.5)
MCHC RBC AUTO-ENTMCNC: 34.3 G/DL (ref 28.4–34.8)
MCV RBC AUTO: 95.3 FL (ref 82.6–102.9)
MONOCYTES NFR BLD: 0.49 K/UL (ref 0.1–1.2)
MONOCYTES NFR BLD: 11 % (ref 3–12)
NEUTROPHILS NFR BLD: 54 % (ref 36–65)
NEUTS SEG NFR BLD: 2.35 K/UL (ref 1.5–8.1)
NRBC BLD-RTO: 0 PER 100 WBC
PLATELET # BLD AUTO: 191 K/UL (ref 138–453)
PMV BLD AUTO: 10.4 FL (ref 8.1–13.5)
POTASSIUM SERPL-SCNC: 4.1 MMOL/L (ref 3.7–5.3)
PROT SERPL-MCNC: 6.6 G/DL (ref 6.6–8.7)
RBC # BLD AUTO: 4.44 M/UL (ref 4.21–5.77)
SODIUM SERPL-SCNC: 140 MMOL/L (ref 136–145)
TRIGL SERPL-MCNC: 41 MG/DL
VLDLC SERPL CALC-MCNC: 8 MG/DL (ref 1–30)
WBC OTHER # BLD: 4.4 K/UL (ref 3.5–11.3)

## 2025-07-15 PROCEDURE — G8427 DOCREV CUR MEDS BY ELIG CLIN: HCPCS | Performed by: PHYSICIAN ASSISTANT

## 2025-07-15 PROCEDURE — 1036F TOBACCO NON-USER: CPT | Performed by: PHYSICIAN ASSISTANT

## 2025-07-15 PROCEDURE — G8419 CALC BMI OUT NRM PARAM NOF/U: HCPCS | Performed by: PHYSICIAN ASSISTANT

## 2025-07-15 PROCEDURE — 99213 OFFICE O/P EST LOW 20 MIN: CPT | Performed by: PHYSICIAN ASSISTANT

## 2025-07-15 PROCEDURE — 3078F DIAST BP <80 MM HG: CPT | Performed by: PHYSICIAN ASSISTANT

## 2025-07-15 PROCEDURE — 99396 PREV VISIT EST AGE 40-64: CPT | Performed by: PHYSICIAN ASSISTANT

## 2025-07-15 PROCEDURE — 3074F SYST BP LT 130 MM HG: CPT | Performed by: PHYSICIAN ASSISTANT

## 2025-07-15 RX ORDER — SIMVASTATIN 20 MG
20 TABLET ORAL NIGHTLY
Qty: 90 TABLET | Refills: 1 | Status: SHIPPED | OUTPATIENT
Start: 2025-07-15

## 2025-07-15 RX ORDER — HYDROCORTISONE 25 MG/G
CREAM TOPICAL
Qty: 28 G | Refills: 0 | Status: SHIPPED | OUTPATIENT
Start: 2025-07-15

## 2025-07-15 RX ORDER — CLONIDINE HYDROCHLORIDE 0.1 MG/1
0.1 TABLET ORAL 2 TIMES DAILY
Qty: 180 TABLET | Refills: 1 | Status: SHIPPED | OUTPATIENT
Start: 2025-07-15 | End: 2025-07-18

## 2025-07-15 RX ORDER — LABETALOL 200 MG/1
200 TABLET, FILM COATED ORAL 2 TIMES DAILY
Qty: 180 TABLET | Refills: 1 | Status: SHIPPED | OUTPATIENT
Start: 2025-07-15 | End: 2025-07-18

## 2025-07-15 RX ORDER — TADALAFIL 10 MG/1
10 TABLET ORAL DAILY
Qty: 90 TABLET | Refills: 1 | Status: SHIPPED | OUTPATIENT
Start: 2025-07-15

## 2025-07-15 RX ORDER — LOSARTAN POTASSIUM 50 MG/1
50 TABLET ORAL DAILY
Qty: 90 TABLET | Refills: 1 | Status: SHIPPED | OUTPATIENT
Start: 2025-07-15

## 2025-07-15 RX ORDER — AMLODIPINE BESYLATE 10 MG/1
10 TABLET ORAL DAILY
Qty: 90 TABLET | Refills: 1 | Status: SHIPPED | OUTPATIENT
Start: 2025-07-15

## 2025-07-15 RX ORDER — MUPIROCIN 2 %
OINTMENT (GRAM) TOPICAL
Qty: 22 G | Refills: 0 | Status: SHIPPED | OUTPATIENT
Start: 2025-07-15 | End: 2025-07-22

## 2025-07-15 SDOH — ECONOMIC STABILITY: FOOD INSECURITY: WITHIN THE PAST 12 MONTHS, THE FOOD YOU BOUGHT JUST DIDN'T LAST AND YOU DIDN'T HAVE MONEY TO GET MORE.: NEVER TRUE

## 2025-07-15 SDOH — ECONOMIC STABILITY: FOOD INSECURITY: WITHIN THE PAST 12 MONTHS, YOU WORRIED THAT YOUR FOOD WOULD RUN OUT BEFORE YOU GOT MONEY TO BUY MORE.: NEVER TRUE

## 2025-07-15 ASSESSMENT — ENCOUNTER SYMPTOMS
COUGH: 0
DIARRHEA: 0
VOMITING: 0
SINUS PAIN: 0
BACK PAIN: 0
ABDOMINAL PAIN: 0
EYE PAIN: 0
CONSTIPATION: 0
RHINORRHEA: 0
NAUSEA: 0
SHORTNESS OF BREATH: 0

## 2025-07-15 ASSESSMENT — PATIENT HEALTH QUESTIONNAIRE - PHQ9
SUM OF ALL RESPONSES TO PHQ QUESTIONS 1-9: 0
1. LITTLE INTEREST OR PLEASURE IN DOING THINGS: NOT AT ALL
2. FEELING DOWN, DEPRESSED OR HOPELESS: NOT AT ALL

## 2025-07-15 NOTE — PROGRESS NOTES
[facility]  [facility]  [address] [place] [address] [address]  [address]  [facility] [address]  Dept: [phone]  Dept Fax: [phone]    Harjit Sanchez Jr. is a 45 y.o. male who presents today for his medical conditions/complaints as noted below.    Chief Complaint   Patient presents with    Annual Exam       HPI:     Patient presents to the office for annual physical exam.  He has past medical history including hypertension, BPH, hyperlipidemia, ED.  We reviewed his current medications.  He is requesting 90-day refill.  Denies any side effects to current medications.  Blood pressure is well-controlled.  He is compliant with losartan, amlodipine, clonidine, labetalol.  No lightheadedness, dizziness, shortness of breath, chest pain, leg edema, syncope.  Patient does admit to significant stress in his life.  This includes his job, children, taking care of aging parents.  At times this is causing him to be stretched pretty thin.  Denies overt depression.    Otherwise, patient does admit to recurrent rectal itching and discomfort.  At times he will feel a little bump.  Denies any blood in the stool.  No rapid weight loss.  No appetite changes.  No abdominal pain.  He is concerned for possible hemorrhoids, but he is not sure if he wants to see a surgeon quite yet.    He also admits to chronic nasal congestion and difficulty breathing through the nose.  He feels he has a deviated septum/crooked nose.  He does not want a referral to ENT.    He also admits to some small little bumps in the groin region.  He thinks these are ingrown hairs.  Slight itching and discomfort.    No other acute changes.  BP stable.        Hemoglobin A1C (%)   Date Value   05/03/2023 5.2   03/12/2021 5.3             ( goal A1C is < 7)   No components found for: \"LABMICR\"  No components found for: \"LDLCHOLESTEROL\", \"LDLCALC\"    (goal LDL is <100)   AST (U/L)   Date Value   07/11/2024 18     ALT (U/L)   Date Value

## 2025-07-16 LAB
EST. AVERAGE GLUCOSE BLD GHB EST-MCNC: 100 MG/DL
HBA1C MFR BLD: 5.1 % (ref 4–6)

## 2025-07-18 DIAGNOSIS — I10 ESSENTIAL HYPERTENSION: ICD-10-CM

## 2025-07-18 RX ORDER — LABETALOL 200 MG/1
200 TABLET, FILM COATED ORAL 2 TIMES DAILY
Qty: 180 TABLET | Refills: 1 | Status: SHIPPED | OUTPATIENT
Start: 2025-07-18

## 2025-07-18 RX ORDER — CLONIDINE HYDROCHLORIDE 0.1 MG/1
0.1 TABLET ORAL 2 TIMES DAILY
Qty: 180 TABLET | Refills: 1 | Status: SHIPPED | OUTPATIENT
Start: 2025-07-18

## 2025-07-24 ENCOUNTER — OFFICE VISIT (OUTPATIENT)
Dept: SURGERY | Age: 45
End: 2025-07-24
Payer: COMMERCIAL

## 2025-07-24 VITALS
SYSTOLIC BLOOD PRESSURE: 127 MMHG | OXYGEN SATURATION: 93 % | HEART RATE: 81 BPM | DIASTOLIC BLOOD PRESSURE: 82 MMHG | HEIGHT: 70 IN | BODY MASS INDEX: 27.24 KG/M2 | WEIGHT: 190.3 LBS

## 2025-07-24 DIAGNOSIS — K64.4 EXTERNAL HEMORRHOIDS: Primary | ICD-10-CM

## 2025-07-24 DIAGNOSIS — K64.0 FIRST DEGREE HEMORRHOIDS: ICD-10-CM

## 2025-07-24 PROCEDURE — 99203 OFFICE O/P NEW LOW 30 MIN: CPT | Performed by: COLON & RECTAL SURGERY

## 2025-07-24 PROCEDURE — 3079F DIAST BP 80-89 MM HG: CPT | Performed by: COLON & RECTAL SURGERY

## 2025-07-24 PROCEDURE — 3074F SYST BP LT 130 MM HG: CPT | Performed by: COLON & RECTAL SURGERY

## 2025-07-24 PROCEDURE — G8419 CALC BMI OUT NRM PARAM NOF/U: HCPCS | Performed by: COLON & RECTAL SURGERY

## 2025-07-24 PROCEDURE — G8427 DOCREV CUR MEDS BY ELIG CLIN: HCPCS | Performed by: COLON & RECTAL SURGERY

## 2025-07-24 PROCEDURE — 1036F TOBACCO NON-USER: CPT | Performed by: COLON & RECTAL SURGERY

## 2025-07-24 PROCEDURE — 46600 DIAGNOSTIC ANOSCOPY SPX: CPT | Performed by: COLON & RECTAL SURGERY

## 2025-07-24 ASSESSMENT — ENCOUNTER SYMPTOMS
DIARRHEA: 0
WHEEZING: 0
CHEST TIGHTNESS: 0
BACK PAIN: 0
APNEA: 0
ANAL BLEEDING: 0
ABDOMINAL DISTENTION: 0
ABDOMINAL PAIN: 0
STRIDOR: 0
COUGH: 0
COLOR CHANGE: 0
VOMITING: 0
RECTAL PAIN: 0
CONSTIPATION: 0
NAUSEA: 0
SHORTNESS OF BREATH: 0
BLOOD IN STOOL: 0

## 2025-07-24 NOTE — PROGRESS NOTES
[facility]  [address] [id number]  Dept: [phone]    Patient:  Harjit Sanchez Jr.  YOB: 1980  Date: 7/24/2025     The patient is a 45 y.o. male who presents today for consult of the following problems:     Chief Complaint: New Patient (External hemorrhoids/)       HPI:     Harjit Sanchez Jr. a 45 y.o. male is here for a new patient consult to discuss external hemorrhoids. Patient states this started 2 years ago. He feels them on the outside and can push the hemorrhoids back in.  Patient had Anusol prescribed but has not used. Patient uses preporation H cream with some benefit.  He reports swelling, itching in the past, rectal bleeding when wiping (or in the toilet) in the past, feeling pressure at the rectum, feeling any lumps with wiping.   He denies constipation, diarrhea, rectal pain, blood in stool, abdominal cramping.   He describes BM as soft and small without straining daily    Examination chaperoned by Benny Hernandez MD.      History:     Past Medical History:   Diagnosis Date    Hyperlipidemia     Hypertension      Past Surgical History:   Procedure Laterality Date    FOOT SURGERY      TONSILLECTOMY AND ADENOIDECTOMY       Family History   Problem Relation Age of Onset    Heart Attack Maternal Cousin      Social History     Tobacco Use    Smoking status: Never    Smokeless tobacco: Never   Substance Use Topics    Alcohol use: Yes     Comment: rare     Drug use: Yes     Types: Marijuana (Weed)     Current Outpatient Medications   Medication Sig Dispense Refill    labetalol (NORMODYNE) 200 MG tablet TAKE 1 TABLET BY MOUTH 2 TIMES A  tablet 1    cloNIDine (CATAPRES) 0.1 MG tablet TAKE 1 TABLET BY MOUTH 2 TIMES A  tablet 1    amLODIPine (NORVASC) 10 MG tablet Take 1 tablet by mouth daily 90 tablet 1    losartan (COZAAR) 50 MG tablet Take 1 tablet by mouth daily